# Patient Record
Sex: MALE | Race: WHITE | Employment: UNEMPLOYED | URBAN - METROPOLITAN AREA
[De-identification: names, ages, dates, MRNs, and addresses within clinical notes are randomized per-mention and may not be internally consistent; named-entity substitution may affect disease eponyms.]

---

## 2023-12-09 ENCOUNTER — HOSPITAL ENCOUNTER (INPATIENT)
Age: 53
LOS: 6 days | Discharge: HOME | End: 2023-12-15
Attending: EMERGENCY MEDICINE | Admitting: HOSPITALIST
Payer: MEDICAID

## 2023-12-09 ENCOUNTER — APPOINTMENT (OUTPATIENT)
Dept: GENERAL RADIOLOGY | Age: 53
End: 2023-12-09
Payer: MEDICAID

## 2023-12-09 DIAGNOSIS — R07.9 CHEST PAIN, UNSPECIFIED TYPE: ICD-10-CM

## 2023-12-09 DIAGNOSIS — F41.1 ANXIETY STATE: ICD-10-CM

## 2023-12-09 DIAGNOSIS — J44.1 COPD EXACERBATION (HCC): Primary | ICD-10-CM

## 2023-12-09 DIAGNOSIS — R06.02 SHORTNESS OF BREATH: ICD-10-CM

## 2023-12-09 PROBLEM — J96.00 ACUTE RESPIRATORY FAILURE (HCC): Status: ACTIVE | Noted: 2023-12-09

## 2023-12-09 LAB
ALBUMIN SERPL-MCNC: 4 G/DL (ref 3.4–5)
ALBUMIN/GLOB SERPL: 1.1 {RATIO} (ref 1.1–2.2)
ALP SERPL-CCNC: 102 U/L (ref 40–129)
ALT SERPL-CCNC: 57 U/L (ref 10–40)
AMPHETAMINES UR QL SCN>1000 NG/ML: POSITIVE
ANION GAP SERPL CALCULATED.3IONS-SCNC: 15 MMOL/L (ref 3–16)
AST SERPL-CCNC: 48 U/L (ref 15–37)
BARBITURATES UR QL SCN>200 NG/ML: ABNORMAL
BASE EXCESS BLDV CALC-SCNC: 0.2 MMOL/L
BASOPHILS # BLD: 0 K/UL (ref 0–0.2)
BASOPHILS NFR BLD: 0.2 %
BENZODIAZ UR QL SCN>200 NG/ML: ABNORMAL
BILIRUB SERPL-MCNC: 0.7 MG/DL (ref 0–1)
BILIRUB UR QL STRIP.AUTO: NEGATIVE
BUN SERPL-MCNC: 6 MG/DL (ref 7–20)
CALCIUM SERPL-MCNC: 9.6 MG/DL (ref 8.3–10.6)
CANNABINOIDS UR QL SCN>50 NG/ML: ABNORMAL
CHLORIDE SERPL-SCNC: 97 MMOL/L (ref 99–110)
CLARITY UR: CLEAR
CO2 BLDV-SCNC: 27 MMOL/L
CO2 SERPL-SCNC: 21 MMOL/L (ref 21–32)
COCAINE UR QL SCN: ABNORMAL
COHGB MFR BLDV: 0.7 %
COLOR UR: YELLOW
CREAT SERPL-MCNC: 0.6 MG/DL (ref 0.9–1.3)
D DIMER: 0.52 UG/ML FEU (ref 0–0.6)
DEPRECATED RDW RBC AUTO: 13.6 % (ref 12.4–15.4)
DRUG SCREEN COMMENT UR-IMP: ABNORMAL
EKG ATRIAL RATE: 121 BPM
EKG DIAGNOSIS: NORMAL
EKG P AXIS: 80 DEGREES
EKG P-R INTERVAL: 150 MS
EKG Q-T INTERVAL: 324 MS
EKG QRS DURATION: 80 MS
EKG QTC CALCULATION (BAZETT): 460 MS
EKG R AXIS: 70 DEGREES
EKG T AXIS: 63 DEGREES
EKG VENTRICULAR RATE: 121 BPM
EOSINOPHIL # BLD: 0 K/UL (ref 0–0.6)
EOSINOPHIL NFR BLD: 0 %
FENTANYL SCREEN, URINE: ABNORMAL
FLUAV RNA UPPER RESP QL NAA+PROBE: NEGATIVE
FLUBV AG NPH QL: NEGATIVE
GFR SERPLBLD CREATININE-BSD FMLA CKD-EPI: >60 ML/MIN/{1.73_M2}
GLUCOSE SERPL-MCNC: 172 MG/DL (ref 70–99)
GLUCOSE UR STRIP.AUTO-MCNC: NEGATIVE MG/DL
HCO3 BLDV-SCNC: 25 MMOL/L (ref 23–29)
HCT VFR BLD AUTO: 37.6 % (ref 40.5–52.5)
HGB BLD-MCNC: 12.6 G/DL (ref 13.5–17.5)
HGB UR QL STRIP.AUTO: NEGATIVE
KETONES UR STRIP.AUTO-MCNC: NEGATIVE MG/DL
LACTATE BLDV-SCNC: 1.1 MMOL/L (ref 0.4–1.9)
LACTATE BLDV-SCNC: 2.2 MMOL/L (ref 0.4–1.9)
LEUKOCYTE ESTERASE UR QL STRIP.AUTO: NEGATIVE
LYMPHOCYTES # BLD: 1.1 K/UL (ref 1–5.1)
LYMPHOCYTES NFR BLD: 8.3 %
MCH RBC QN AUTO: 30.3 PG (ref 26–34)
MCHC RBC AUTO-ENTMCNC: 33.4 G/DL (ref 31–36)
MCV RBC AUTO: 90.8 FL (ref 80–100)
METHADONE UR QL SCN>300 NG/ML: ABNORMAL
METHGB MFR BLDV: 0.5 %
MONOCYTES # BLD: 0.4 K/UL (ref 0–1.3)
MONOCYTES NFR BLD: 3.1 %
NEUTROPHILS # BLD: 11.9 K/UL (ref 1.7–7.7)
NEUTROPHILS NFR BLD: 88.4 %
NITRITE UR QL STRIP.AUTO: NEGATIVE
NT-PROBNP SERPL-MCNC: 69 PG/ML (ref 0–124)
O2 THERAPY: NORMAL
OPIATES UR QL SCN>300 NG/ML: ABNORMAL
OXYCODONE UR QL SCN: ABNORMAL
PCO2 BLDV: 41.9 MMHG (ref 40–50)
PCP UR QL SCN>25 NG/ML: ABNORMAL
PH BLDV: 7.39 [PH] (ref 7.35–7.45)
PH UR STRIP.AUTO: 6.5 [PH] (ref 5–8)
PH UR STRIP: 6.5 [PH]
PLATELET # BLD AUTO: 348 K/UL (ref 135–450)
PLATELET BLD QL SMEAR: ADEQUATE
PMV BLD AUTO: 8.1 FL (ref 5–10.5)
PO2 BLDV: 58 MMHG
POTASSIUM SERPL-SCNC: 4.6 MMOL/L (ref 3.5–5.1)
PROT SERPL-MCNC: 7.5 G/DL (ref 6.4–8.2)
PROT UR STRIP.AUTO-MCNC: NEGATIVE MG/DL
RBC # BLD AUTO: 4.14 M/UL (ref 4.2–5.9)
SAO2 % BLDV: 87 %
SARS-COV-2 RDRP RESP QL NAA+PROBE: NOT DETECTED
SLIDE REVIEW: ABNORMAL
SODIUM SERPL-SCNC: 133 MMOL/L (ref 136–145)
SP GR UR STRIP.AUTO: 1.01 (ref 1–1.03)
TROPONIN, HIGH SENSITIVITY: 10 NG/L (ref 0–22)
TROPONIN, HIGH SENSITIVITY: 11 NG/L (ref 0–22)
UA COMPLETE W REFLEX CULTURE PNL UR: NORMAL
UA DIPSTICK W REFLEX MICRO PNL UR: NORMAL
URN SPEC COLLECT METH UR: NORMAL
UROBILINOGEN UR STRIP-ACNC: 0.2 E.U./DL
WBC # BLD AUTO: 13.4 K/UL (ref 4–11)

## 2023-12-09 PROCEDURE — 2580000003 HC RX 258: Performed by: HOSPITALIST

## 2023-12-09 PROCEDURE — 36415 COLL VENOUS BLD VENIPUNCTURE: CPT

## 2023-12-09 PROCEDURE — 99255 IP/OBS CONSLTJ NEW/EST HI 80: CPT | Performed by: INTERNAL MEDICINE

## 2023-12-09 PROCEDURE — 96365 THER/PROPH/DIAG IV INF INIT: CPT

## 2023-12-09 PROCEDURE — 99285 EMERGENCY DEPT VISIT HI MDM: CPT

## 2023-12-09 PROCEDURE — 80307 DRUG TEST PRSMV CHEM ANLYZR: CPT

## 2023-12-09 PROCEDURE — 93010 ELECTROCARDIOGRAM REPORT: CPT | Performed by: INTERNAL MEDICINE

## 2023-12-09 PROCEDURE — 82803 BLOOD GASES ANY COMBINATION: CPT

## 2023-12-09 PROCEDURE — 6360000002 HC RX W HCPCS: Performed by: EMERGENCY MEDICINE

## 2023-12-09 PROCEDURE — 96375 TX/PRO/DX INJ NEW DRUG ADDON: CPT

## 2023-12-09 PROCEDURE — 81003 URINALYSIS AUTO W/O SCOPE: CPT

## 2023-12-09 PROCEDURE — 87635 SARS-COV-2 COVID-19 AMP PRB: CPT

## 2023-12-09 PROCEDURE — 80053 COMPREHEN METABOLIC PANEL: CPT

## 2023-12-09 PROCEDURE — 2700000000 HC OXYGEN THERAPY PER DAY

## 2023-12-09 PROCEDURE — 85025 COMPLETE CBC W/AUTO DIFF WBC: CPT

## 2023-12-09 PROCEDURE — 6370000000 HC RX 637 (ALT 250 FOR IP): Performed by: INTERNAL MEDICINE

## 2023-12-09 PROCEDURE — 83880 ASSAY OF NATRIURETIC PEPTIDE: CPT

## 2023-12-09 PROCEDURE — 83605 ASSAY OF LACTIC ACID: CPT

## 2023-12-09 PROCEDURE — 71045 X-RAY EXAM CHEST 1 VIEW: CPT

## 2023-12-09 PROCEDURE — 87804 INFLUENZA ASSAY W/OPTIC: CPT

## 2023-12-09 PROCEDURE — 94660 CPAP INITIATION&MGMT: CPT

## 2023-12-09 PROCEDURE — 6360000002 HC RX W HCPCS: Performed by: HOSPITALIST

## 2023-12-09 PROCEDURE — 6370000000 HC RX 637 (ALT 250 FOR IP): Performed by: EMERGENCY MEDICINE

## 2023-12-09 PROCEDURE — 85379 FIBRIN DEGRADATION QUANT: CPT

## 2023-12-09 PROCEDURE — 87040 BLOOD CULTURE FOR BACTERIA: CPT

## 2023-12-09 PROCEDURE — 93005 ELECTROCARDIOGRAM TRACING: CPT | Performed by: EMERGENCY MEDICINE

## 2023-12-09 PROCEDURE — 94640 AIRWAY INHALATION TREATMENT: CPT

## 2023-12-09 PROCEDURE — 94761 N-INVAS EAR/PLS OXIMETRY MLT: CPT

## 2023-12-09 PROCEDURE — 84484 ASSAY OF TROPONIN QUANT: CPT

## 2023-12-09 PROCEDURE — 2000000000 HC ICU R&B

## 2023-12-09 RX ORDER — ALBUTEROL SULFATE 2.5 MG/3ML
2.5 SOLUTION RESPIRATORY (INHALATION) ONCE
Status: DISCONTINUED | OUTPATIENT
Start: 2023-12-09 | End: 2023-12-15 | Stop reason: HOSPADM

## 2023-12-09 RX ORDER — ACETAMINOPHEN 650 MG/1
650 SUPPOSITORY RECTAL EVERY 6 HOURS PRN
Status: DISCONTINUED | OUTPATIENT
Start: 2023-12-09 | End: 2023-12-15 | Stop reason: HOSPADM

## 2023-12-09 RX ORDER — LEVOFLOXACIN 5 MG/ML
750 INJECTION, SOLUTION INTRAVENOUS EVERY 24 HOURS
Status: COMPLETED | OUTPATIENT
Start: 2023-12-10 | End: 2023-12-13

## 2023-12-09 RX ORDER — IPRATROPIUM BROMIDE AND ALBUTEROL SULFATE 2.5; .5 MG/3ML; MG/3ML
1 SOLUTION RESPIRATORY (INHALATION)
Status: DISCONTINUED | OUTPATIENT
Start: 2023-12-09 | End: 2023-12-15 | Stop reason: HOSPADM

## 2023-12-09 RX ORDER — POTASSIUM CHLORIDE 7.45 MG/ML
10 INJECTION INTRAVENOUS PRN
Status: DISCONTINUED | OUTPATIENT
Start: 2023-12-09 | End: 2023-12-15 | Stop reason: HOSPADM

## 2023-12-09 RX ORDER — ONDANSETRON 2 MG/ML
4 INJECTION INTRAMUSCULAR; INTRAVENOUS EVERY 6 HOURS PRN
Status: DISCONTINUED | OUTPATIENT
Start: 2023-12-09 | End: 2023-12-15 | Stop reason: HOSPADM

## 2023-12-09 RX ORDER — ONDANSETRON 4 MG/1
4 TABLET, ORALLY DISINTEGRATING ORAL EVERY 8 HOURS PRN
Status: DISCONTINUED | OUTPATIENT
Start: 2023-12-09 | End: 2023-12-15 | Stop reason: HOSPADM

## 2023-12-09 RX ORDER — IPRATROPIUM BROMIDE AND ALBUTEROL SULFATE 2.5; .5 MG/3ML; MG/3ML
1 SOLUTION RESPIRATORY (INHALATION) ONCE
Status: COMPLETED | OUTPATIENT
Start: 2023-12-09 | End: 2023-12-09

## 2023-12-09 RX ORDER — ALBUTEROL SULFATE 2.5 MG/3ML
2.5 SOLUTION RESPIRATORY (INHALATION) ONCE
Status: COMPLETED | OUTPATIENT
Start: 2023-12-09 | End: 2023-12-09

## 2023-12-09 RX ORDER — POLYETHYLENE GLYCOL 3350 17 G/17G
17 POWDER, FOR SOLUTION ORAL DAILY PRN
Status: DISCONTINUED | OUTPATIENT
Start: 2023-12-09 | End: 2023-12-15 | Stop reason: HOSPADM

## 2023-12-09 RX ORDER — LORAZEPAM 2 MG/ML
0.5 INJECTION INTRAMUSCULAR ONCE
Status: COMPLETED | OUTPATIENT
Start: 2023-12-09 | End: 2023-12-09

## 2023-12-09 RX ORDER — LORAZEPAM 2 MG/ML
1 INJECTION INTRAMUSCULAR EVERY 6 HOURS PRN
Status: DISCONTINUED | OUTPATIENT
Start: 2023-12-09 | End: 2023-12-14

## 2023-12-09 RX ORDER — POTASSIUM CHLORIDE 20 MEQ/1
40 TABLET, EXTENDED RELEASE ORAL PRN
Status: DISCONTINUED | OUTPATIENT
Start: 2023-12-09 | End: 2023-12-15 | Stop reason: HOSPADM

## 2023-12-09 RX ORDER — ENOXAPARIN SODIUM 100 MG/ML
40 INJECTION SUBCUTANEOUS DAILY
Status: DISCONTINUED | OUTPATIENT
Start: 2023-12-09 | End: 2023-12-15 | Stop reason: HOSPADM

## 2023-12-09 RX ORDER — ACETAMINOPHEN 325 MG/1
650 TABLET ORAL EVERY 6 HOURS PRN
Status: DISCONTINUED | OUTPATIENT
Start: 2023-12-09 | End: 2023-12-15 | Stop reason: HOSPADM

## 2023-12-09 RX ORDER — LEVOFLOXACIN 5 MG/ML
750 INJECTION, SOLUTION INTRAVENOUS ONCE
Status: COMPLETED | OUTPATIENT
Start: 2023-12-09 | End: 2023-12-09

## 2023-12-09 RX ORDER — MAGNESIUM SULFATE IN WATER 40 MG/ML
2000 INJECTION, SOLUTION INTRAVENOUS PRN
Status: DISCONTINUED | OUTPATIENT
Start: 2023-12-09 | End: 2023-12-15 | Stop reason: HOSPADM

## 2023-12-09 RX ORDER — SODIUM CHLORIDE 0.9 % (FLUSH) 0.9 %
5-40 SYRINGE (ML) INJECTION EVERY 12 HOURS SCHEDULED
Status: DISCONTINUED | OUTPATIENT
Start: 2023-12-09 | End: 2023-12-15 | Stop reason: HOSPADM

## 2023-12-09 RX ORDER — ALBUTEROL SULFATE 2.5 MG/3ML
2.5 SOLUTION RESPIRATORY (INHALATION)
Status: DISCONTINUED | OUTPATIENT
Start: 2023-12-09 | End: 2023-12-09

## 2023-12-09 RX ORDER — SODIUM CHLORIDE 9 MG/ML
INJECTION, SOLUTION INTRAVENOUS CONTINUOUS
Status: DISCONTINUED | OUTPATIENT
Start: 2023-12-09 | End: 2023-12-12

## 2023-12-09 RX ORDER — SODIUM CHLORIDE 9 MG/ML
INJECTION, SOLUTION INTRAVENOUS PRN
Status: DISCONTINUED | OUTPATIENT
Start: 2023-12-09 | End: 2023-12-15 | Stop reason: HOSPADM

## 2023-12-09 RX ORDER — METHYLPREDNISOLONE SODIUM SUCCINATE 40 MG/ML
40 INJECTION, POWDER, LYOPHILIZED, FOR SOLUTION INTRAMUSCULAR; INTRAVENOUS EVERY 8 HOURS
Status: DISCONTINUED | OUTPATIENT
Start: 2023-12-09 | End: 2023-12-13

## 2023-12-09 RX ORDER — SODIUM CHLORIDE 0.9 % (FLUSH) 0.9 %
5-40 SYRINGE (ML) INJECTION PRN
Status: DISCONTINUED | OUTPATIENT
Start: 2023-12-09 | End: 2023-12-15 | Stop reason: HOSPADM

## 2023-12-09 RX ADMIN — ALBUTEROL SULFATE 2.5 MG: 2.5 SOLUTION RESPIRATORY (INHALATION) at 12:13

## 2023-12-09 RX ADMIN — ALBUTEROL SULFATE 2.5 MG: 2.5 SOLUTION RESPIRATORY (INHALATION) at 04:16

## 2023-12-09 RX ADMIN — METHYLPREDNISOLONE SODIUM SUCCINATE 40 MG: 40 INJECTION INTRAMUSCULAR; INTRAVENOUS at 08:53

## 2023-12-09 RX ADMIN — SODIUM CHLORIDE, PRESERVATIVE FREE 10 ML: 5 INJECTION INTRAVENOUS at 08:53

## 2023-12-09 RX ADMIN — IPRATROPIUM BROMIDE AND ALBUTEROL SULFATE 1 DOSE: 2.5; .5 SOLUTION RESPIRATORY (INHALATION) at 16:01

## 2023-12-09 RX ADMIN — SODIUM CHLORIDE, PRESERVATIVE FREE 10 ML: 5 INJECTION INTRAVENOUS at 20:09

## 2023-12-09 RX ADMIN — IPRATROPIUM BROMIDE AND ALBUTEROL SULFATE 1 DOSE: .5; 3 SOLUTION RESPIRATORY (INHALATION) at 04:16

## 2023-12-09 RX ADMIN — METHYLPREDNISOLONE SODIUM SUCCINATE 40 MG: 40 INJECTION INTRAMUSCULAR; INTRAVENOUS at 17:06

## 2023-12-09 RX ADMIN — Medication 0.5 MG: at 04:24

## 2023-12-09 RX ADMIN — IPRATROPIUM BROMIDE 0.5 MG: 0.5 SOLUTION RESPIRATORY (INHALATION) at 12:13

## 2023-12-09 RX ADMIN — ENOXAPARIN SODIUM 40 MG: 100 INJECTION SUBCUTANEOUS at 08:53

## 2023-12-09 RX ADMIN — Medication 1 MG: at 11:54

## 2023-12-09 RX ADMIN — SODIUM CHLORIDE: 9 INJECTION, SOLUTION INTRAVENOUS at 08:56

## 2023-12-09 RX ADMIN — Medication 1 MG: at 17:52

## 2023-12-09 RX ADMIN — IPRATROPIUM BROMIDE AND ALBUTEROL SULFATE 1 DOSE: 2.5; .5 SOLUTION RESPIRATORY (INHALATION) at 20:14

## 2023-12-09 RX ADMIN — SODIUM CHLORIDE: 9 INJECTION, SOLUTION INTRAVENOUS at 22:08

## 2023-12-09 RX ADMIN — LEVOFLOXACIN 750 MG: 5 INJECTION, SOLUTION INTRAVENOUS at 07:03

## 2023-12-09 ASSESSMENT — PAIN DESCRIPTION - PAIN TYPE: TYPE: ACUTE PAIN

## 2023-12-09 ASSESSMENT — PAIN SCALES - GENERAL
PAINLEVEL_OUTOF10: 0
PAINLEVEL_OUTOF10: 7
PAINLEVEL_OUTOF10: 0

## 2023-12-09 ASSESSMENT — PAIN DESCRIPTION - DESCRIPTORS: DESCRIPTORS: ACHING

## 2023-12-09 ASSESSMENT — PAIN DESCRIPTION - ORIENTATION: ORIENTATION: LEFT

## 2023-12-09 ASSESSMENT — PAIN DESCRIPTION - ONSET: ONSET: ON-GOING

## 2023-12-09 ASSESSMENT — PAIN DESCRIPTION - FREQUENCY: FREQUENCY: CONTINUOUS

## 2023-12-09 ASSESSMENT — PAIN DESCRIPTION - LOCATION: LOCATION: RIB CAGE

## 2023-12-09 NOTE — PROGRESS NOTES
Patient states he gets his medications through [facility] services. RN called pharmacist to inform and to try and get medication list information. RN also called patient's contact number Parrish Altman and requested to bring in a copy of medications. She stated she will try to bring them in tomorrow.      Yanna Azar RN

## 2023-12-09 NOTE — PROGRESS NOTES
12/09/23 0343   NIV Type   $NIV $Daily Charge   Ventilator ID [id number]   NIV Started/Stopped On   Equipment Type V60`   Mode Bilevel   Mask Type Full face mask   Mask Size Medium   Assessment   Pulse (!) 133   Heart Rate Source Monitor   Respirations (!) 32   SpO2 90 %   Level of Consciousness 0   Comfort Level Good   Using Accessory Muscles Yes   Mask Compliance Good   Skin Assessment Clean, dry, & intact   Skin Protection for O2 Device Yes   Orientation Middle   Location Nose   Intervention(s) Skin Barrier   Breath Sounds   Right Upper Lobe Expiratory wheezes   Right Middle Lobe Expiratory wheezes   Right Lower Lobe Expiratory wheezes   Left Upper Lobe Expiratory wheezes   Left Lower Lobe Expiratory wheezes   Settings/Measurements   PIP Observed 16 cm H20   IPAP 16 cmH20   CPAP/EPAP 6 cmH2O   Vt (Measured) 576 mL   Rate Ordered 16   Insp Rise Time (%) 3 %   FiO2  40 %   I Time/ I Time % 1 s   Minute Volume (L/min) 8.8 Liters   Mask Leak (lpm) 23 lpm   Alarm Settings   Alarms On Y   High Pressure (cmH2O) 40 cmH2O   Delay Alarm 20 sec(s)   Apnea (secs) 20 secs   RR High (bpm) 40 br/min

## 2023-12-09 NOTE — PROGRESS NOTES
Per patient he admits to eating meth, last time he did so is on Sunday per patient.      Corrinne High, RN

## 2023-12-09 NOTE — ED NOTES
Report called to ICU. All questions answered. Will contact RT to transport.       Santillanrachel Parr, [address]  12/09/23 8636

## 2023-12-09 NOTE — FLOWSHEET NOTE
Reassessment complete. Patient with intermittent episodes of anxiousness and very fidgety. Patient all over the bed and cannot sit still. Patient educated to try and relax, prn ativan given. Remains on BPAP. Will monitor.      Jaguar Sanchez RN

## 2023-12-09 NOTE — ED NOTES
Reyes Gresham, patients emergency contact called and given update on pt. Advised of room number.       Giovany Arciniega, [address]  12/09/23 4378

## 2023-12-09 NOTE — PLAN OF CARE
VSS. NSR/ST on monitor. Patient is alert and oriented, restless and anxious at times. Remains on BPAP. Turns self well for pressure ulcer prevention. Medication for DVT prophylaxis. Free from any injury. Patient updated on plan of care. Will continue plan of care.       Problem: Discharge Planning  Goal: Discharge to home or other facility with appropriate resources  Outcome: Progressing  Flowsheets (Taken 12/9/2023 8352)  Discharge to home or other facility with appropriate resources:   Identify barriers to discharge with patient and caregiver   Arrange for needed discharge resources and transportation as appropriate   Identify discharge learning needs (meds, wound care, etc)     Problem: Pain  Goal: Verbalizes/displays adequate comfort level or baseline comfort level  Outcome: Progressing     Problem: Safety - Adult  Goal: Free from fall injury  Outcome: Progressing     Problem: Respiratory - Adult  Goal: Achieves optimal ventilation and oxygenation  Outcome: Progressing     Problem: Musculoskeletal - Adult  Goal: Return mobility to safest level of function  Outcome: Progressing     Problem: Infection - Adult  Goal: Absence of infection at discharge  Outcome: Progressing     Problem: Chronic Conditions and Co-morbidities  Goal: Patient's chronic conditions and co-morbidity symptoms are monitored and maintained or improved  Outcome: Progressing

## 2023-12-09 NOTE — H&P
V2.0  History and Physical      Name:  Glenny Figueroa /Age/Sex: 1970  (47 y.o. male)   MRN & CSN:  [id number] & [id number] Encounter Date/Time: 2023 7:24 AM EST   Location:  [address] [place] [address] PCP: Minal Blanco [address] [place] [address]  Day: 1    Assessment and Plan:   Glenny Figueroa is a 47 y.o. male with a pmh of  who presents with Acute respiratory failure [facility]    Hospital Problems             Last Modified POA    * (Principal) Acute respiratory failure ([address] ) 2023 Yes         Acute respiratory failure with hypoxia  H/o polysubstance abuse   Bipolar DO  PTSD      Plan    Admit to ICU  Admit inpatient status  Telemetry monitering    Pulmonary consult   Empiric IV antibiotics  Follow up Blood culture x 2  Cont BIPAP  Oxygen  Duonebs         Critical care time spent > 35 minutes at bed side          Disposition:   Current Living situation:   Expected Disposition:   Estimated D/C:     Diet No diet orders on file   DVT Prophylaxis [] Lovenox, []  Heparin, [] SCDs, [] Ambulation,  [] Eliquis, [] Xarelto, [] Coumadin   Code Status No Order   Surrogate Decision Maker/ POA      Personally reviewed Lab Studies and Imaging     Discussed management of the case with  ED who recommended   Hospital admission for Acute respiratory failure management    EKG interpreted personally and results     Imaging that was interpreted personally includes CXR   and results  acute bronchitis     Drugs that require monitoring for toxicity include IV levaquin  and the method of monitoring was telemetry         History from:       ED        History of Present Illness:     Chief Complaint:   Glenny Figueroa is a 47 y.o. male with pmh of  polysubstance use  , h/o alcohol abuse , mood disorder who presents with  c/o SOB x 2-3 days  His pulmonologist increased his prednisone from 10 mg daily to 40 mg daily   He was brought by EMS , he was tripoding on arrival.  Having significant work of breathing and was placed on BIPAP    ED    D

## 2023-12-09 NOTE — CONSULTS
REASON FOR CONSULTATION/CC: Wheezing      Consult at request of Eric Roy MD     PCP: KATY Kasper - CNP  Established Pulmonologist:  None    Chief Complaint   Patient presents with    Respiratory Distress     Increasing difficulty breathing x 3 days. Presents via EMS. Duoneb provided by EMS. Unable to obtain IV access per EMS. HISTORY OF PRESENT ILLNESS: Moni Thompson is a 47y.o. year old male with a history of polysubstance abuse, mood disorder who presents with shortness of breath x 3 days. Patient brought to the ED placed on BiPAP. During my visit patient on BiPAP but over the HPI is obtained from bedside nurse report EMR. He is awake however. In the ED he admitted to taking methamphetamines. He has a history of reported bipolar and schizophrenia. Reported wheezing so was given bronchodilator therapy but no known history of reactive airways disease. Past Medical History:   Diagnosis Date    Bipolar 1 disorder ([address])     COPD (chronic obstructive pulmonary disease) ([facility])     PTSD (post-traumatic stress disorder)          No past surgical history on file. Family Hx  family history is not on file. Unable to obtain due to BiPAP  Social Hx   has no history on file for tobacco use.     Scheduled Meds:   albuterol  2.5 mg Nebulization Once    [START ON 12/10/2023] levofloxacin  750 mg IntraVENous Q24H    methylPREDNISolone  40 mg IntraVENous Q8H    sodium chloride flush  5-40 mL IntraVENous 2 times per day    enoxaparin  40 mg SubCUTAneous Daily    ipratropium 0.5 mg-albuterol 2.5 mg  1 Dose Inhalation Q4H WA RT       Continuous Infusions:   sodium chloride 75 mL/hr at 12/09/23 0856    sodium chloride         PRN Meds:  LORazepam, sodium chloride flush, sodium chloride, potassium chloride **OR** potassium alternative oral replacement **OR** potassium chloride, magnesium sulfate, ondansetron **OR** ondansetron, polyethylene glycol, acetaminophen **OR**

## 2023-12-09 NOTE — PROGRESS NOTES
4 Eyes Skin Assessment     The patient is being assess for  Admission    I agree that 2 RN's have performed a thorough Head to Toe Skin Assessment on the patient. ALL assessment sites listed below have been assessed. Areas assessed by both nurses: Amy Gibson RN  [x]   Head, Face, and Ears   [x]   Shoulders, Back, and Chest  [x]   Arms, Elbows, and Hands   [x]   Coccyx, Sacrum, and Ischum  [x]   Legs, Feet, and Heels        Does the Patient have Skin Breakdown?   No - patient has multiple abrasions and scabbing all over body         Alon Prevention initiated:  NA   Wound Care Orders initiated:  No      WOC nurse consulted for Pressure Injury (Stage 3,4, Unstageable, DTI, NWPT, and Complex wounds):  NA      Nurse 1 eSignature: Electronically signed by Luz Marina Xie RN on 12/9/23 at 10:46 AM EST    **SHARE this note so that the co-signing nurse is able to place an eSignature**    Nurse 2 eSignature: Electronically signed by Jay Osborne RN on 12/9/23 at 5:56 PM EST

## 2023-12-09 NOTE — ED NOTES
Handoff report received from [place], [place]. Pt is awake in bed with BiPap on. Pt placed in gown and all other clothing placed in bag.        Jacob Vieyra RN  12/09/23 0858

## 2023-12-09 NOTE — PROGRESS NOTES
When completing patient's admission, patient states he is on \"a lot of medications\" patient cannot name any medications but states he is on \"30 some medications\" and to \"look them up in chart\". Patient with difficulty talking due to breathing, requested medication list or to be brought in by family to review.      Katie Erickson RN

## 2023-12-09 NOTE — ED PROVIDER NOTES
[address] [place] [address] EMERGENCY DEPARTMENT    CHIEF COMPLAINT  Respiratory Distress (Increasing difficulty breathing x 3 days. Presents via EMS. Duoneb provided by EMS. Unable to obtain IV access per EMS. )       HISTORY OF PRESENT ILLNESS  Molly Flannery is a 47 y.o. male with history of COPD, hyperlipidemia, hypertension, schizoaffective disorder, ? alcohol dependence who presents to the ED w/ shortness of breath. States that he has been having increasing shortness of breath over the past 3 days. Reports cough. His pulmonologist increased his prednisone dose from his daily 10 mg to 40 mg which she took today. Symptoms worsened this evening and he presents with chest pain, shortness of breath and increased work of breathing. He was saturating 96% upon EMS arrival.  Wheezing noted by EMS. He was given 2 nebulizer treatments en route. Patient reports left-sided chest pain. Reports chronic feet swelling that is unchanged. Denies fever. I have reviewed the following from the nursing documentation:    No past medical history on file. No past surgical history on file. No family history on file.   Social History     Socioeconomic History    Marital status: Single     Spouse name: Not on file    Number of children: Not on file    Years of education: Not on file    Highest education level: Not on file   Occupational History    Not on file   Tobacco Use    Smoking status: Not on file    Smokeless tobacco: Not on file   Substance and Sexual Activity    Alcohol use: Not on file    Drug use: Not on file    Sexual activity: Not on file   Other Topics Concern    Not on file   Social History Narrative    Not on file     Social Determinants of Health     Financial Resource Strain: Not on file   Food Insecurity: Not on file   Transportation Needs: Not on file   Physical Activity: Not on file   Stress: Not on file   Social Connections: Not on file   Intimate Partner Violence: Not on file   Housing Stability: Not on

## 2023-12-10 PROBLEM — F15.20 METHAMPHETAMINE USE DISORDER, MODERATE (HCC): Status: ACTIVE | Noted: 2023-12-10

## 2023-12-10 PROBLEM — R06.02 SHORTNESS OF BREATH: Status: ACTIVE | Noted: 2023-12-10

## 2023-12-10 PROBLEM — F41.9 ANXIETY DISORDER: Status: ACTIVE | Noted: 2023-12-10

## 2023-12-10 PROBLEM — R45.851 SUICIDAL IDEATION: Status: ACTIVE | Noted: 2023-12-10

## 2023-12-10 PROBLEM — F32.A DEPRESSION: Status: ACTIVE | Noted: 2023-12-10

## 2023-12-10 PROBLEM — J44.1 COPD EXACERBATION (HCC): Status: ACTIVE | Noted: 2023-12-10

## 2023-12-10 LAB
ANION GAP SERPL CALCULATED.3IONS-SCNC: 11 MMOL/L (ref 3–16)
BASOPHILS # BLD: 0 K/UL (ref 0–0.2)
BASOPHILS NFR BLD: 0.1 %
BUN SERPL-MCNC: 11 MG/DL (ref 7–20)
CALCIUM SERPL-MCNC: 9 MG/DL (ref 8.3–10.6)
CHLORIDE SERPL-SCNC: 101 MMOL/L (ref 99–110)
CO2 SERPL-SCNC: 25 MMOL/L (ref 21–32)
CREAT SERPL-MCNC: 0.8 MG/DL (ref 0.9–1.3)
DEPRECATED RDW RBC AUTO: 13.5 % (ref 12.4–15.4)
EOSINOPHIL # BLD: 0 K/UL (ref 0–0.6)
EOSINOPHIL NFR BLD: 0 %
GFR SERPLBLD CREATININE-BSD FMLA CKD-EPI: >60 ML/MIN/{1.73_M2}
GLUCOSE SERPL-MCNC: 197 MG/DL (ref 70–99)
HCT VFR BLD AUTO: 32.2 % (ref 40.5–52.5)
HGB BLD-MCNC: 11.2 G/DL (ref 13.5–17.5)
LYMPHOCYTES # BLD: 0.5 K/UL (ref 1–5.1)
LYMPHOCYTES NFR BLD: 5.4 %
MAGNESIUM SERPL-MCNC: 2 MG/DL (ref 1.8–2.4)
MCH RBC QN AUTO: 31.6 PG (ref 26–34)
MCHC RBC AUTO-ENTMCNC: 34.7 G/DL (ref 31–36)
MCV RBC AUTO: 91 FL (ref 80–100)
MONOCYTES # BLD: 0.4 K/UL (ref 0–1.3)
MONOCYTES NFR BLD: 4.6 %
NEUTROPHILS # BLD: 7.6 K/UL (ref 1.7–7.7)
NEUTROPHILS NFR BLD: 89.9 %
PHOSPHATE SERPL-MCNC: 2.8 MG/DL (ref 2.5–4.9)
PLATELET # BLD AUTO: 313 K/UL (ref 135–450)
PMV BLD AUTO: 7 FL (ref 5–10.5)
POTASSIUM SERPL-SCNC: 4 MMOL/L (ref 3.5–5.1)
RBC # BLD AUTO: 3.54 M/UL (ref 4.2–5.9)
SODIUM SERPL-SCNC: 137 MMOL/L (ref 136–145)
WBC # BLD AUTO: 8.4 K/UL (ref 4–11)

## 2023-12-10 PROCEDURE — 83735 ASSAY OF MAGNESIUM: CPT

## 2023-12-10 PROCEDURE — 6370000000 HC RX 637 (ALT 250 FOR IP): Performed by: NURSE PRACTITIONER

## 2023-12-10 PROCEDURE — 85025 COMPLETE CBC W/AUTO DIFF WBC: CPT

## 2023-12-10 PROCEDURE — 2000000000 HC ICU R&B

## 2023-12-10 PROCEDURE — 80048 BASIC METABOLIC PNL TOTAL CA: CPT

## 2023-12-10 PROCEDURE — 99254 IP/OBS CNSLTJ NEW/EST MOD 60: CPT | Performed by: PSYCHIATRY & NEUROLOGY

## 2023-12-10 PROCEDURE — 6370000000 HC RX 637 (ALT 250 FOR IP): Performed by: INTERNAL MEDICINE

## 2023-12-10 PROCEDURE — 99232 SBSQ HOSP IP/OBS MODERATE 35: CPT | Performed by: INTERNAL MEDICINE

## 2023-12-10 PROCEDURE — 94761 N-INVAS EAR/PLS OXIMETRY MLT: CPT

## 2023-12-10 PROCEDURE — 2580000003 HC RX 258: Performed by: HOSPITALIST

## 2023-12-10 PROCEDURE — 84100 ASSAY OF PHOSPHORUS: CPT

## 2023-12-10 PROCEDURE — 6360000002 HC RX W HCPCS: Performed by: NURSE PRACTITIONER

## 2023-12-10 PROCEDURE — 6360000002 HC RX W HCPCS: Performed by: HOSPITALIST

## 2023-12-10 PROCEDURE — 36415 COLL VENOUS BLD VENIPUNCTURE: CPT

## 2023-12-10 PROCEDURE — 94640 AIRWAY INHALATION TREATMENT: CPT

## 2023-12-10 PROCEDURE — 2700000000 HC OXYGEN THERAPY PER DAY

## 2023-12-10 PROCEDURE — 6370000000 HC RX 637 (ALT 250 FOR IP): Performed by: HOSPITALIST

## 2023-12-10 PROCEDURE — APPNB15 APP NON BILLABLE TIME 0-15 MINS: Performed by: NURSE PRACTITIONER

## 2023-12-10 PROCEDURE — 94660 CPAP INITIATION&MGMT: CPT

## 2023-12-10 RX ORDER — LABETALOL HYDROCHLORIDE 5 MG/ML
10 INJECTION, SOLUTION INTRAVENOUS EVERY 6 HOURS PRN
Status: DISCONTINUED | OUTPATIENT
Start: 2023-12-10 | End: 2023-12-15 | Stop reason: HOSPADM

## 2023-12-10 RX ORDER — WATER 10 ML/10ML
INJECTION INTRAMUSCULAR; INTRAVENOUS; SUBCUTANEOUS
Status: DISPENSED
Start: 2023-12-10 | End: 2023-12-10

## 2023-12-10 RX ORDER — GUAIFENESIN/DEXTROMETHORPHAN 100-10MG/5
10 SYRUP ORAL EVERY 4 HOURS PRN
Status: DISCONTINUED | OUTPATIENT
Start: 2023-12-10 | End: 2023-12-15 | Stop reason: HOSPADM

## 2023-12-10 RX ADMIN — Medication 1 MG: at 22:20

## 2023-12-10 RX ADMIN — GUAIFENESIN SYRUP AND DEXTROMETHORPHAN 10 ML: 100; 10 SYRUP ORAL at 07:54

## 2023-12-10 RX ADMIN — ENOXAPARIN SODIUM 40 MG: 100 INJECTION SUBCUTANEOUS at 08:05

## 2023-12-10 RX ADMIN — METHYLPREDNISOLONE SODIUM SUCCINATE 40 MG: 40 INJECTION INTRAMUSCULAR; INTRAVENOUS at 07:56

## 2023-12-10 RX ADMIN — SODIUM CHLORIDE, PRESERVATIVE FREE 10 ML: 5 INJECTION INTRAVENOUS at 07:57

## 2023-12-10 RX ADMIN — GUAIFENESIN SYRUP AND DEXTROMETHORPHAN 10 ML: 100; 10 SYRUP ORAL at 23:26

## 2023-12-10 RX ADMIN — LABETALOL HYDROCHLORIDE 10 MG: 5 INJECTION INTRAVENOUS at 23:21

## 2023-12-10 RX ADMIN — METHYLPREDNISOLONE SODIUM SUCCINATE 40 MG: 40 INJECTION INTRAMUSCULAR; INTRAVENOUS at 19:18

## 2023-12-10 RX ADMIN — IPRATROPIUM BROMIDE AND ALBUTEROL SULFATE 1 DOSE: 2.5; .5 SOLUTION RESPIRATORY (INHALATION) at 19:39

## 2023-12-10 RX ADMIN — SODIUM CHLORIDE, PRESERVATIVE FREE 10 ML: 5 INJECTION INTRAVENOUS at 20:24

## 2023-12-10 RX ADMIN — GUAIFENESIN SYRUP AND DEXTROMETHORPHAN 10 ML: 100; 10 SYRUP ORAL at 14:05

## 2023-12-10 RX ADMIN — Medication 1 MG: at 14:18

## 2023-12-10 RX ADMIN — Medication 1 MG: at 00:00

## 2023-12-10 RX ADMIN — METHYLPREDNISOLONE SODIUM SUCCINATE 40 MG: 40 INJECTION INTRAMUSCULAR; INTRAVENOUS at 00:30

## 2023-12-10 RX ADMIN — GUAIFENESIN SYRUP AND DEXTROMETHORPHAN 10 ML: 100; 10 SYRUP ORAL at 03:06

## 2023-12-10 RX ADMIN — IPRATROPIUM BROMIDE AND ALBUTEROL SULFATE 1 DOSE: 2.5; .5 SOLUTION RESPIRATORY (INHALATION) at 07:58

## 2023-12-10 RX ADMIN — LEVOFLOXACIN 750 MG: 5 INJECTION, SOLUTION INTRAVENOUS at 07:38

## 2023-12-10 RX ADMIN — ACETAMINOPHEN 650 MG: 325 TABLET ORAL at 22:19

## 2023-12-10 RX ADMIN — Medication 1 MG: at 07:53

## 2023-12-10 ASSESSMENT — PAIN SCALES - GENERAL
PAINLEVEL_OUTOF10: 0
PAINLEVEL_OUTOF10: 3
PAINLEVEL_OUTOF10: 0

## 2023-12-10 ASSESSMENT — PAIN DESCRIPTION - LOCATION: LOCATION: CHEST

## 2023-12-10 NOTE — PROGRESS NOTES
PRN Ativan dose tubed up from pharmacy in a 2 mg dose syringe. 1 mg given as per order, 1 mg wasted with second RN.      Electronically signed by Scott Huang RN on 12/10/2023 at 5:07 AM

## 2023-12-10 NOTE — PROGRESS NOTES
0000: Patient restless and anxious in bed. Tachypnic and tachycardic on monitor. Patient on bipap and gets coughing spells which increase his SOB and anxiety. 1 mg PRN Ativan given as per order. Secure message sent to NP to request something to help with cough. Patient updated. Call light in reach, bed alarm on.     0040: pt sleeping in bed, bipap on. Call light and urinal in reach. Will monitor. New order for prn cough medicine if needed.

## 2023-12-10 NOTE — PROGRESS NOTES
Pt open up about how frustrated he is with his life. He opened up about how he is upset about how his life turned out and how frustrated he is with his health. He says his past actions haunt him and all he does is think about them.      Electronically signed by Wilmer Lund RN on 12/10/2023 at 7:06 PM

## 2023-12-10 NOTE — PLAN OF CARE
Problem: Discharge Planning  Goal: Discharge to home or other facility with appropriate resources  12/10/2023 0845 by Tori Mcburney, RN  Outcome: Progressing  Flowsheets (Taken 12/10/2023 0800)  Discharge to home or other facility with appropriate resources:   Identify barriers to discharge with patient and caregiver   Arrange for needed discharge resources and transportation as appropriate  12/9/2023 2226 by Kassidy Alvarenga RN  Outcome: Progressing  Flowsheets  Taken 12/9/2023 2005 by Kassidy Alvarenga RN  Discharge to home or other facility with appropriate resources:   Identify barriers to discharge with patient and caregiver   Identify discharge learning needs (meds, wound care, etc)   Arrange for needed discharge resources and transportation as appropriate   Refer to discharge planning if patient needs post-hospital services based on physician order or complex needs related to functional status, cognitive ability or social support system  Taken 12/9/2023 1600 by Negar Jean RN  Discharge to home or other facility with appropriate resources:   Identify barriers to discharge with patient and caregiver   Arrange for needed discharge resources and transportation as appropriate   Identify discharge learning needs (meds, wound care, etc)     Problem: Pain  Goal: Verbalizes/displays adequate comfort level or baseline comfort level  12/10/2023 0845 by Tori Mcburney, RN  Outcome: Progressing  Flowsheets (Taken 12/10/2023 0805)  Verbalizes/displays adequate comfort level or baseline comfort level: Encourage patient to monitor pain and request assistance  12/9/2023 2226 by Kassidy Alvarenga RN  Outcome: Progressing     Problem: Safety - Adult  Goal: Free from fall injury  12/10/2023 0845 by Tori Mcburney, RN  Outcome: Progressing  Flowsheets (Taken 12/10/2023 0845)  Free From Fall Injury: Instruct family/caregiver on patient safety  12/9/2023 2226 by Kassidy Alvarenga RN  Outcome:

## 2023-12-10 NOTE — PROGRESS NOTES
Pt told Psych MD he is suicidal and tried to overdose on meth prior to coming to the hospital. Suicide sitter placed at bedside.     Electronically signed by Trish Caldwell RN on 12/10/2023 at 8:38 AM

## 2023-12-10 NOTE — PLAN OF CARE
Problem: Discharge Planning  Goal: Discharge to home or other facility with appropriate resources  12/9/2023 2226 by Ary Mcdowell RN  Outcome: Progressing  Flowsheets  Taken 12/9/2023 2005 by Ary Mcdowell RN  Discharge to home or other facility with appropriate resources:   Identify barriers to discharge with patient and caregiver   Identify discharge learning needs (meds, wound care, etc)   Arrange for needed discharge resources and transportation as appropriate   Refer to discharge planning if patient needs post-hospital services based on physician order or complex needs related to functional status, cognitive ability or social support system  Taken 12/9/2023 1600 by Luz Marina Xie RN  Discharge to home or other facility with appropriate resources:   Identify barriers to discharge with patient and caregiver   Arrange for needed discharge resources and transportation as appropriate   Identify discharge learning needs (meds, wound care, etc)     Problem: Pain  Goal: Verbalizes/displays adequate comfort level or baseline comfort level  12/9/2023 2226 by Ary Mcdowell RN  Outcome: Progressing     Problem: Safety - Adult  Goal: Free from fall injury  12/9/2023 2226 by Ary Mcdowell RN  Outcome: Progressing     Problem: Respiratory - Adult  Goal: Achieves optimal ventilation and oxygenation  12/9/2023 2226 by Ary Mcdowell RN  Outcome: Progressing     Problem: Musculoskeletal - Adult  Goal: Return mobility to safest level of function  12/9/2023 2226 by Ary Mcdowell RN  Outcome: Progressing  Flowsheets  Taken 12/9/2023 2005 by Ary Mcdowell RN  Return Mobility to Safest Level of Function:   Assess patient stability and activity tolerance for standing, transferring and ambulating with or without assistive devices   Assist with transfers and ambulation using safe patient handling equipment as needed   Instruct patient/family in ordered activity level  Taken 12/9/2023 1600 by Torito Diehl RN  Return Mobility to Safest Level of Function:   Assess patient stability and activity tolerance for standing, transferring and ambulating with or without assistive devices   Assist with transfers and ambulation using safe patient handling equipment as needed     Problem: Infection - Adult  Goal: Absence of infection at discharge  12/9/2023 2226 by Maya Massey RN  Outcome: Progressing  Flowsheets  Taken 12/9/2023 2005 by Maya Massey RN  Absence of infection at discharge:   Assess and monitor for signs and symptoms of infection   Monitor lab/diagnostic results   Monitor all insertion sites i.e., indwelling lines, tubes and drains   Administer medications as ordered   Identify and instruct in appropriate isolation precautions for identified infection/condition  Taken 12/9/2023 1600 by Torito Diehl RN  Absence of infection at discharge:   Assess and monitor for signs and symptoms of infection   Monitor lab/diagnostic results     Problem: Chronic Conditions and Co-morbidities  Goal: Patient's chronic conditions and co-morbidity symptoms are monitored and maintained or improved  12/9/2023 2226 by Maya Massey RN  Outcome: Progressing  Flowsheets  Taken 12/9/2023 2005 by Pancho Ace - Patient's Chronic Conditions and Co-Morbidity Symptoms are Monitored and Maintained or Improved:   Monitor and assess patient's chronic conditions and comorbid symptoms for stability, deterioration, or improvement   Collaborate with multidisciplinary team to address chronic and comorbid conditions and prevent exacerbation or deterioration   Update acute care plan with appropriate goals if chronic or comorbid symptoms are exacerbated and prevent overall improvement and discharge  Taken 12/9/2023 1600 by Pancho Claros - Patient's Chronic Conditions and Co-Morbidity Symptoms are Monitored and Maintained or Improved: Monitor and assess patient's chronic

## 2023-12-10 NOTE — PROGRESS NOTES
Medication Reconciliation    List of medications patient is currently taking is IN PROGRESS     We are anticipating patient's caretaker to bring in a list of home medications today    FLAVIA Gomez to notify me when caretaker arrives.     Janine Arauz RPH, PharmD, BCPS  12/10/2023 10:26 AM

## 2023-12-10 NOTE — CONSULTS
PSYCHIATRY CONSULT, INITIAL EVALUATION    Attending Provider:  Alirio Vargas MD    CC/Reason for Consult: bipolar disorder, schizophrenia    HPI:   context: Pt is a 48 yo M with hx of COPD, HANDY, bipolar disorder, schizoaffective disorder, PTSD, substance abuse who presented to the hospital with shortness of breath. He also reported recent methamphetamine ingestion. associated symptoms:   Pt reports that he has been increasingly anxious and depressed about his overall health, mainly his breathing. He has been getting more hopeless, not able to sleep for stretches of time up to 3 days, and more depressed as a result. About 3 days prior to admission he was starting to think about suicide and states prior to admission he ingested a large amount of methamphetamines to kill himself. He reports doing the same think in May and June but not being success with suicide. At this time he continues to report feeling suicidal, wanting to harm himself by overdose \"its the only way I can think of doing it\", feeling hopeless about his health condition and that nothing will get better with his breathing. He denies hallucinations or paranoia.     modifying factors: pt reports adherence with his medications however he doesn't know what any of them are. He states he doesn't use methamphetamines regularly, the last couple times was just to try to kill himself (twice in the summer, once prior to admission here)    Timing: acute on chronic  duration: many years  severity: severe    Chart review: In May he presented to [facility] with SOB, was transferred to the ED for medical clearance. He has voiced persecutory delusions (thinking his phone and internet were hacked, the police were after him) after recent methamphetamine use. In PES he was diagnosed with methamphetamine induced psychosis and psychosis cleared after about a day of observation.      ROS:   +SOB, denies cp or palpitations, no n/v, no abd pain, no headaches    Past Weight - Scale: 95.7 kg (211 lb), BP: (!) 152/107, Pain 0-10: Pain Level: 0, Location: scattered all over body;     MSE:   Appearance    alert, cooperative, good eye contact  Motor: No abnormal movements, tics or mannerisms. Speech    spontaneous and normal rate, easily short of breath between sentences  Language    0 - no aphasia, normal  Mood/Affect    Anxious and Depressed / depressed affect  Thought Process    linear and goal directed  Thought Content    no delusions , no suicidal ideation  Associations    logical connections  Attention/Concentration    intact  Orientation    oriented to person, place, time, and general circumstances  Memory    recent and remote memory intact  Fund of Knowledge    intact  Insight/Judgement    Poor / Impaired    Labs:     Lab Results   Component Value Date    CREATININE 0.8 (L) 12/10/2023    BUN 11 12/10/2023     12/10/2023    K 4.0 12/10/2023     12/10/2023    CO2 25 12/10/2023     Lab Results   Component Value Date    WBC 8.4 12/10/2023    HGB 11.2 (L) 12/10/2023    HCT 32.2 (L) 12/10/2023    MCV 91.0 12/10/2023     12/10/2023    LYMPHOPCT 5.4 12/10/2023    RBC 3.54 (L) 12/10/2023    MCH 31.6 12/10/2023    MCHC 34.7 12/10/2023    RDW 13.5 12/10/2023     Lab Results   Component Value Date    ALT 57 (H) 12/09/2023    AST 48 (H) 12/09/2023    ALKPHOS 102 12/09/2023    BILITOT 0.7 12/09/2023     No results found for: \"TSH\", \"TSHREFLEX\", \"TSHFT4\", \"TSHELE\", \"DZU5MGB\", \"TSHHS\"  No results found for: \"RVXXRDLV83\"  No results found for: \"FOLATE\"    UDS 12/9/23: +amphetamine       ASSESSMENT:   48 yo M with hx of methamphetamine abuse, substance induced depression and psychosis, and bipolar d/o vs schizoaffective disorder. He is reporting suicidal ideation and that he attempted to kill himself with meth prior to admission and has done this several times this year.  His main stressor is his breathing so certainly if he starts to symptomatically improve with this, and

## 2023-12-10 NOTE — PROGRESS NOTES
V2.0    [facility] [place]      Name:  Rosaura Lawrence /Age/Sex: 1970  (47 y.o. male)   MRN & CSN:  [id number] & [id number] Encounter Date/Time: 12/10/2023 12:07 PM EST   Location:  [id number]-[id number] PCP: KATY Vera CNP     Attending:Quintin Worley MD       Hospital Day: 2    Assessment and Recommendations   Rosaura Lawrence is a 47 y.o. male with pmh of  who presents with Acute respiratory failure ([address])      Acute respiratory failure with hypoxia  H/o polysubstance abuse   Bipolar DO  PTSD        Plan     Admit inpatient status  Telemetry monitering  Pulmonary consult   Empiric IV antibiotics  Follow up Blood culture x 2  Oxygen  Duonebs      Psychiatry consulted  , cont 1 : 1 sitter , need psychiatry meds reconciliation           Diet ADULT DIET; Regular; Safety Tray; Safety Tray (Disposables)   DVT Prophylaxis [] Lovenox, []  Heparin, [] SCDs, [] Ambulation,  [] Eliquis, [] Xarelto  [] Coumadin   Code Status Full Code   Disposition From:   Expected Disposition:   Estimated Date of Discharge:   Patient requires continued admission due to    Surrogate Decision Maker/ POA       Personally reviewed Lab Studies and Imaging           Subjective:     Chief Complaint:     Rosaura Lawrence is a 47 y.o. male who presents with       He is sitting in bed  Appears in distress, appears restless and agitated  Sitter in room +  No chest pain     Review of Systems:      Pertinent positives and negatives discussed in HPI    Objective:      Intake/Output Summary (Last 24 hours) at 12/10/2023 1207  Last data filed at 12/10/2023 0647  Gross per 24 hour   Intake 2333.17 ml   Output 1025 ml   Net 1308.17 ml      Vitals:   Vitals:    12/10/23 0812 12/10/23 0814 12/10/23 0900 12/10/23 1126   BP:   139/81    Pulse: (!) 109 (!) 108 99 (!) 110   Resp:  20   Temp:       TempSrc:       SpO2: 97% 100% 98% 97%   Weight:       Height:             Physical Exam:      General: NAD, restless , agitated  Eyes:

## 2023-12-11 LAB
ANION GAP SERPL CALCULATED.3IONS-SCNC: 7 MMOL/L (ref 3–16)
BASOPHILS # BLD: 0 K/UL (ref 0–0.2)
BASOPHILS NFR BLD: 0.1 %
BUN SERPL-MCNC: 7 MG/DL (ref 7–20)
CALCIUM SERPL-MCNC: 9.6 MG/DL (ref 8.3–10.6)
CHLORIDE SERPL-SCNC: 104 MMOL/L (ref 99–110)
CO2 SERPL-SCNC: 29 MMOL/L (ref 21–32)
CREAT SERPL-MCNC: 0.7 MG/DL (ref 0.9–1.3)
DEPRECATED RDW RBC AUTO: 13.5 % (ref 12.4–15.4)
EOSINOPHIL # BLD: 0 K/UL (ref 0–0.6)
EOSINOPHIL NFR BLD: 0 %
GFR SERPLBLD CREATININE-BSD FMLA CKD-EPI: >60 ML/MIN/{1.73_M2}
GLUCOSE SERPL-MCNC: 175 MG/DL (ref 70–99)
HCT VFR BLD AUTO: 32.7 % (ref 40.5–52.5)
HGB BLD-MCNC: 10.9 G/DL (ref 13.5–17.5)
LYMPHOCYTES # BLD: 0.5 K/UL (ref 1–5.1)
LYMPHOCYTES NFR BLD: 5.2 %
MAGNESIUM SERPL-MCNC: 2.1 MG/DL (ref 1.8–2.4)
MCH RBC QN AUTO: 30.3 PG (ref 26–34)
MCHC RBC AUTO-ENTMCNC: 33.4 G/DL (ref 31–36)
MCV RBC AUTO: 90.6 FL (ref 80–100)
MONOCYTES # BLD: 0.4 K/UL (ref 0–1.3)
MONOCYTES NFR BLD: 4 %
NEUTROPHILS # BLD: 9.4 K/UL (ref 1.7–7.7)
NEUTROPHILS NFR BLD: 90.7 %
PHOSPHATE SERPL-MCNC: 2.5 MG/DL (ref 2.5–4.9)
PLATELET # BLD AUTO: 364 K/UL (ref 135–450)
PMV BLD AUTO: 7 FL (ref 5–10.5)
POTASSIUM SERPL-SCNC: 3.9 MMOL/L (ref 3.5–5.1)
RBC # BLD AUTO: 3.61 M/UL (ref 4.2–5.9)
SODIUM SERPL-SCNC: 140 MMOL/L (ref 136–145)
WBC # BLD AUTO: 10.4 K/UL (ref 4–11)

## 2023-12-11 PROCEDURE — 2580000003 HC RX 258: Performed by: HOSPITALIST

## 2023-12-11 PROCEDURE — 6370000000 HC RX 637 (ALT 250 FOR IP): Performed by: INTERNAL MEDICINE

## 2023-12-11 PROCEDURE — 6370000000 HC RX 637 (ALT 250 FOR IP): Performed by: NURSE PRACTITIONER

## 2023-12-11 PROCEDURE — 6360000002 HC RX W HCPCS: Performed by: HOSPITALIST

## 2023-12-11 PROCEDURE — 94760 N-INVAS EAR/PLS OXIMETRY 1: CPT

## 2023-12-11 PROCEDURE — 83735 ASSAY OF MAGNESIUM: CPT

## 2023-12-11 PROCEDURE — 2700000000 HC OXYGEN THERAPY PER DAY

## 2023-12-11 PROCEDURE — 6360000002 HC RX W HCPCS: Performed by: NURSE PRACTITIONER

## 2023-12-11 PROCEDURE — 2060000000 HC ICU INTERMEDIATE R&B

## 2023-12-11 PROCEDURE — 84100 ASSAY OF PHOSPHORUS: CPT

## 2023-12-11 PROCEDURE — 99233 SBSQ HOSP IP/OBS HIGH 50: CPT | Performed by: REGISTERED NURSE

## 2023-12-11 PROCEDURE — 85025 COMPLETE CBC W/AUTO DIFF WBC: CPT

## 2023-12-11 PROCEDURE — 80048 BASIC METABOLIC PNL TOTAL CA: CPT

## 2023-12-11 PROCEDURE — 94640 AIRWAY INHALATION TREATMENT: CPT

## 2023-12-11 PROCEDURE — 6370000000 HC RX 637 (ALT 250 FOR IP): Performed by: REGISTERED NURSE

## 2023-12-11 PROCEDURE — 94660 CPAP INITIATION&MGMT: CPT

## 2023-12-11 PROCEDURE — 36415 COLL VENOUS BLD VENIPUNCTURE: CPT

## 2023-12-11 RX ORDER — QUETIAPINE FUMARATE 50 MG/1
100 TABLET, EXTENDED RELEASE ORAL NIGHTLY
Status: DISCONTINUED | OUTPATIENT
Start: 2023-12-11 | End: 2023-12-13

## 2023-12-11 RX ORDER — BUSPIRONE HYDROCHLORIDE 5 MG/1
5 TABLET ORAL 3 TIMES DAILY
Status: DISCONTINUED | OUTPATIENT
Start: 2023-12-11 | End: 2023-12-13

## 2023-12-11 RX ORDER — TRAZODONE HYDROCHLORIDE 100 MG/1
100 TABLET ORAL NIGHTLY
Status: DISCONTINUED | OUTPATIENT
Start: 2023-12-11 | End: 2023-12-11

## 2023-12-11 RX ORDER — PALIPERIDONE 3 MG/1
3 TABLET, EXTENDED RELEASE ORAL DAILY
Status: DISCONTINUED | OUTPATIENT
Start: 2023-12-11 | End: 2023-12-15 | Stop reason: HOSPADM

## 2023-12-11 RX ADMIN — ENOXAPARIN SODIUM 40 MG: 100 INJECTION SUBCUTANEOUS at 08:07

## 2023-12-11 RX ADMIN — METHYLPREDNISOLONE SODIUM SUCCINATE 40 MG: 40 INJECTION INTRAMUSCULAR; INTRAVENOUS at 00:27

## 2023-12-11 RX ADMIN — Medication 1 MG: at 17:58

## 2023-12-11 RX ADMIN — SODIUM CHLORIDE, PRESERVATIVE FREE 10 ML: 5 INJECTION INTRAVENOUS at 21:52

## 2023-12-11 RX ADMIN — SODIUM CHLORIDE, PRESERVATIVE FREE 10 ML: 5 INJECTION INTRAVENOUS at 08:08

## 2023-12-11 RX ADMIN — BUSPIRONE HYDROCHLORIDE 5 MG: 5 TABLET ORAL at 16:17

## 2023-12-11 RX ADMIN — METHYLPREDNISOLONE SODIUM SUCCINATE 40 MG: 40 INJECTION INTRAMUSCULAR; INTRAVENOUS at 08:07

## 2023-12-11 RX ADMIN — QUETIAPINE FUMARATE 100 MG: 50 TABLET, EXTENDED RELEASE ORAL at 21:51

## 2023-12-11 RX ADMIN — LEVOFLOXACIN 750 MG: 5 INJECTION, SOLUTION INTRAVENOUS at 07:00

## 2023-12-11 RX ADMIN — IPRATROPIUM BROMIDE AND ALBUTEROL SULFATE 1 DOSE: 2.5; .5 SOLUTION RESPIRATORY (INHALATION) at 21:10

## 2023-12-11 RX ADMIN — IPRATROPIUM BROMIDE AND ALBUTEROL SULFATE 1 DOSE: 2.5; .5 SOLUTION RESPIRATORY (INHALATION) at 16:32

## 2023-12-11 RX ADMIN — BUSPIRONE HYDROCHLORIDE 5 MG: 5 TABLET ORAL at 21:51

## 2023-12-11 RX ADMIN — TRAZODONE HYDROCHLORIDE 100 MG: 100 TABLET ORAL at 00:27

## 2023-12-11 RX ADMIN — IPRATROPIUM BROMIDE AND ALBUTEROL SULFATE 1 DOSE: 2.5; .5 SOLUTION RESPIRATORY (INHALATION) at 08:05

## 2023-12-11 RX ADMIN — LABETALOL HYDROCHLORIDE 10 MG: 5 INJECTION INTRAVENOUS at 22:24

## 2023-12-11 RX ADMIN — PALIPERIDONE 3 MG: 3 TABLET, EXTENDED RELEASE ORAL at 16:18

## 2023-12-11 RX ADMIN — IPRATROPIUM BROMIDE AND ALBUTEROL SULFATE 1 DOSE: 2.5; .5 SOLUTION RESPIRATORY (INHALATION) at 12:01

## 2023-12-11 RX ADMIN — METHYLPREDNISOLONE SODIUM SUCCINATE 40 MG: 40 INJECTION INTRAMUSCULAR; INTRAVENOUS at 17:16

## 2023-12-11 RX ADMIN — SODIUM CHLORIDE: 9 INJECTION, SOLUTION INTRAVENOUS at 04:34

## 2023-12-11 ASSESSMENT — PAIN DESCRIPTION - ORIENTATION: ORIENTATION: LEFT

## 2023-12-11 ASSESSMENT — PAIN DESCRIPTION - ONSET: ONSET: ON-GOING

## 2023-12-11 ASSESSMENT — PAIN DESCRIPTION - DESCRIPTORS: DESCRIPTORS: ACHING

## 2023-12-11 ASSESSMENT — PAIN SCALES - GENERAL
PAINLEVEL_OUTOF10: 0

## 2023-12-11 ASSESSMENT — PAIN DESCRIPTION - PAIN TYPE: TYPE: ACUTE PAIN

## 2023-12-11 ASSESSMENT — PAIN DESCRIPTION - FREQUENCY: FREQUENCY: CONTINUOUS

## 2023-12-11 ASSESSMENT — PAIN DESCRIPTION - LOCATION: LOCATION: CHEST

## 2023-12-11 NOTE — PROGRESS NOTES
4 Eyes Skin Assessment     NAME:  Roxane Leyva  YOB: 1970  MEDICAL RECORD NUMBER:  [id number]    The patient is being assessed for  Shift Handoff    I agree that at least one RN has performed a thorough Head to Toe Skin Assessment on the patient. ALL assessment sites listed below have been assessed. Areas assessed by both nurses:    Head, Face, Ears, Shoulders, Back, Chest, Arms, Elbows, Hands, Sacrum. Buttock, Coccyx, Ischium, Legs. Feet and Heels, and Under Medical Devices         Does the Patient have a Wound?  No noted wound(s)       Alon Prevention initiated by RN: No  Wound Care Orders initiated by RN: No    Pressure Injury (Stage 3,4, Unstageable, DTI, NWPT, and Complex wounds) if present, place Wound referral order by RN under : No    New Ostomies, if present place, Ostomy referral order under : No     Nurse 1 eSignature: Electronically signed by Sande Councilman, RN on 12/10/23 at 7:03 PM EST    **SHARE this note so that the co-signing nurse can place an eSignature**    Nurse 2 eSignature: Electronically signed by bAiodun Parker RN on 12/10/23 at 9:23 PM EST

## 2023-12-11 NOTE — PROGRESS NOTES
Administered 1 mg Ativan and wasted 1 mg Ativan per [facility] policy.      Electronically signed by Laine Beck RN on 12/11/2023 at 1:07 AM

## 2023-12-11 NOTE — PROGRESS NOTES
V2.0    [facility] [place] Progress Note      Name:  Homa Anderson /Age/Sex: 1970  (47 y.o. male)   MRN & CSN:  [id number] & [id number] Encounter Date/Time: 2023 12:07 PM EST   Location:  [id number]-8993 PCP: KATY Jackson - CNP     Attending:Lg Worley MD       Hospital Day: 3    Assessment and Recommendations   Homa Anderson is a 47 y.o. male with pmh of  who presents with Acute respiratory failure ([address])      Acute respiratory failure with hypoxia  H/o polysubstance abuse   Bipolar DO  PTSD    Plan    Telemetry monitering  Pulmonary consult   Empiric IV antibiotics : levaquin  IV steroids  Follow up Blood culture x 2 : NGTD  Oxygen  Duonebs      Psychiatry consulted  , cont 1 : 1 sitter , need psychiatry meds reconciliation           Diet ADULT DIET; Regular; Safety Tray; Safety Tray (Disposables)   DVT Prophylaxis [] Lovenox, []  Heparin, [] SCDs, [] Ambulation,  [] Eliquis, [] Xarelto  [] Coumadin   Code Status Full Code   Disposition From:   Expected Disposition:   Estimated Date of Discharge:   Patient requires continued admission due to    Surrogate Decision Maker/ POA       Personally reviewed Lab Studies and Imaging           Subjective:     Chief Complaint:     Homa Anderson is a 47 y.o. male who presents with     On CPAP today am  He is sitting in bed  Sitter in room +  No chest pain     Review of Systems:      Pertinent positives and negatives discussed in HPI    Objective:      Intake/Output Summary (Last 24 hours) at 2023 1129  Last data filed at 2023 0346  Gross per 24 hour   Intake 240 ml   Output 2500 ml   Net -2260 ml        Vitals:   Vitals:    23 0805 23 0806 23 0900 23 1000   BP:   128/78 (!) 139/98   Pulse: 97 85 84 85   Resp: 26 14 13 13   Temp:       TempSrc:       SpO2: 99% 99% 97% 96%   Weight:       Height:             Physical Exam:      General: NAD,  on CPAP +  Eyes: EOMI  ENT: neck supple  Cardiovascular: Regular

## 2023-12-11 NOTE — PROGRESS NOTES
RN walked into patients room for assessment. Pt was naked, sitting on the side of the bed with his inhaler \"trying to catch his breath\". RN asked pt why he was naked, pt stated \" I am allowed to be naked if I want to be\". O2 saturation 96%. Bed in lowest position, 1:1 sitter at bedside for continued suicide precautions.

## 2023-12-11 NOTE — PROGRESS NOTES
4 Eyes Skin Assessment     NAME:  Maura Giraldo  YOB: 1970  MEDICAL RECORD NUMBER:  [id number]    The patient is being assessed for  Transfer to New Unit    I agree that at least one RN has performed a thorough Head to Toe Skin Assessment on the patient. ALL assessment sites listed below have been assessed. Areas assessed by both nurses:    Head, Face, Ears, Shoulders, Back, Chest, Arms, Elbows, Hands, Sacrum. Buttock, Coccyx, Ischium, Legs. Feet and Heels, and Under Medical Devices         Does the Patient have a Wound?  No noted wound(s)       Alon Prevention initiated by RN: Yes  Wound Care Orders initiated by RN: No    Pressure Injury (Stage 3,4, Unstageable, DTI, NWPT, and Complex wounds) if present, place Wound referral order by RN under : No    New Ostomies, if present place, Ostomy referral order under : No     Nurse 1 eSignature: Electronically signed by Jaycob Villasenor RN on 12/11/23 at 5:20 PM EST    **SHARE this note so that the co-signing nurse can place an eSignature**    Nurse 2 eSignature: {Esignature:[id number]}

## 2023-12-11 NOTE — PROGRESS NOTES
LVM for patient regarding referral and scheduling for  who has replaced . Provided [facility] number for scheduling options.    Report called to [facility]. Pt transferred to [facility] via bed with bipap on.

## 2023-12-11 NOTE — PLAN OF CARE
Problem: Discharge Planning  Goal: Discharge to home or other facility with appropriate resources  12/10/2023 2106 by Joann Sharma RN  Outcome: Progressing  Flowsheets (Taken 12/10/2023 2000)  Discharge to home or other facility with appropriate resources:   Identify barriers to discharge with patient and caregiver   Arrange for needed discharge resources and transportation as appropriate     Problem: Pain  Goal: Verbalizes/displays adequate comfort level or baseline comfort level  12/10/2023 2106 by Joann Sharma RN  Outcome: Progressing     Problem: Safety - Adult  Goal: Free from fall injury  12/10/2023 2106 by Joann Sharma RN  Outcome: Progressing     Problem: Respiratory - Adult  Goal: Achieves optimal ventilation and oxygenation  12/10/2023 2106 by Joann Sharma RN  Outcome: Progressing  Flowsheets (Taken 12/10/2023 2000)  Achieves optimal ventilation and oxygenation:   Assess for changes in respiratory status   Assess for changes in mentation and behavior     Problem: Musculoskeletal - Adult  Goal: Return mobility to safest level of function  12/10/2023 2106 by Joann Sharma RN  Outcome: Progressing  Flowsheets (Taken 12/10/2023 2000)  Return Mobility to Safest Level of Function:   Assess patient stability and activity tolerance for standing, transferring and ambulating with or without assistive devices   Assist with transfers and ambulation using safe patient handling equipment as needed     Problem: Infection - Adult  Goal: Absence of infection at discharge  12/10/2023 2106 by Joann Sharma RN  Outcome: Progressing  Flowsheets (Taken 12/10/2023 2000)  Absence of infection at discharge:   Assess and monitor for signs and symptoms of infection   Monitor lab/diagnostic results     Problem: Chronic Conditions and Co-morbidities  Goal: Patient's chronic conditions and co-morbidity symptoms are monitored and maintained or improved  12/10/2023 2106 by Joann Sharma, RN  Outcome: Progressing  Flowsheets (Taken  12/10/2023 2000)  Care Plan - Patient's Chronic Conditions and Co-Morbidity Symptoms are Monitored and Maintained or Improved: Monitor and assess patient's chronic conditions and comorbid symptoms for stability, deterioration, or improvement

## 2023-12-11 NOTE — CARE COORDINATION
SW following, psyc following. Unsure what dc plan at this point will be.   Pt has own apt  Electronically signed by KRYSTLE Snow on 12/11/2023 at 9:35 AM

## 2023-12-11 NOTE — PROGRESS NOTES
PSYCHIATRY CONSULT PROGRESS NOTE    12/11/2023  Korey Diggs  [id number]  Referring Provider:  Bridget Segovia MD    CC/Reason for Consult: Bipolar d/o, schizophrenia     Impression/diagnosis     Methamphetamine use disorder      2. Anxiety, NOS     Recommendations:     Psychiatric     Continue 1:1 bed side sitter. I could not able to assess patient for suicidal ideation as patient having increased work of breathing and coughing. 2. Called the patient's pharmacy at [facility] and talked with [facility] to verify patient' psychotropic medications. Per pharmacy patient takes Invega  4.5 mg/daily, Prozac 20 mg/daily, Seroquel  mg at HS, Cogentin 0.5 mg BID, Buspar 15 mg TID and Trazodone 100 mg at HS. 3. Resume home psychotropic medications at gentle dose: Invega 3 mg/daily, Seroquel  mg/nightly, Buspar 5 mg TID. Increase doses as patient's respiratory issues resolve in the coming days. Monitor for sedation. 4. Will follow along with the patient. Dispo: Pending     S:Seen a room and sitter at bed side for suicide precautions. Reports he did not sleep last night. Noted increased work of breathing VS unstable. He was acting inappropriately last night ( trying to sit naked on the floor in a room per RN documentation.)     Behavior issues in last 24hours: Received Ativan this morning. ROS: Hyperventilation, restlessness.      PMH/SH/ reviewed and no changes     traZODone  100 mg Oral Nightly    albuterol  2.5 mg Nebulization Once    levofloxacin  750 mg IntraVENous Q24H    methylPREDNISolone  40 mg IntraVENous Q8H    sodium chloride flush  5-40 mL IntraVENous 2 times per day    enoxaparin  40 mg SubCUTAneous Daily    ipratropium 0.5 mg-albuterol 2.5 mg  1 Dose Inhalation Q4H WA RT     guaiFENesin-dextromethorphan, labetalol, LORazepam, sodium chloride flush, sodium chloride, potassium chloride **OR** potassium alternative oral replacement **OR** potassium chloride, magnesium

## 2023-12-12 PROBLEM — F41.1 ANXIETY STATE: Status: ACTIVE | Noted: 2023-12-12

## 2023-12-12 LAB
ANION GAP SERPL CALCULATED.3IONS-SCNC: 8 MMOL/L (ref 3–16)
BASOPHILS # BLD: 0 K/UL (ref 0–0.2)
BASOPHILS NFR BLD: 0.2 %
BUN SERPL-MCNC: 11 MG/DL (ref 7–20)
CALCIUM SERPL-MCNC: 9.1 MG/DL (ref 8.3–10.6)
CHLORIDE SERPL-SCNC: 104 MMOL/L (ref 99–110)
CO2 SERPL-SCNC: 30 MMOL/L (ref 21–32)
CREAT SERPL-MCNC: 0.8 MG/DL (ref 0.9–1.3)
DEPRECATED RDW RBC AUTO: 13.5 % (ref 12.4–15.4)
EOSINOPHIL # BLD: 0 K/UL (ref 0–0.6)
EOSINOPHIL NFR BLD: 0 %
GFR SERPLBLD CREATININE-BSD FMLA CKD-EPI: >60 ML/MIN/{1.73_M2}
GLUCOSE SERPL-MCNC: 148 MG/DL (ref 70–99)
HCT VFR BLD AUTO: 31.7 % (ref 40.5–52.5)
HGB BLD-MCNC: 10.8 G/DL (ref 13.5–17.5)
LYMPHOCYTES # BLD: 0.6 K/UL (ref 1–5.1)
LYMPHOCYTES NFR BLD: 6.1 %
MAGNESIUM SERPL-MCNC: 2.2 MG/DL (ref 1.8–2.4)
MCH RBC QN AUTO: 30.7 PG (ref 26–34)
MCHC RBC AUTO-ENTMCNC: 34 G/DL (ref 31–36)
MCV RBC AUTO: 90.2 FL (ref 80–100)
MONOCYTES # BLD: 0.5 K/UL (ref 0–1.3)
MONOCYTES NFR BLD: 5.6 %
NEUTROPHILS # BLD: 8.6 K/UL (ref 1.7–7.7)
NEUTROPHILS NFR BLD: 88.1 %
PHOSPHATE SERPL-MCNC: 2.8 MG/DL (ref 2.5–4.9)
PLATELET # BLD AUTO: 339 K/UL (ref 135–450)
PMV BLD AUTO: 6.8 FL (ref 5–10.5)
POTASSIUM SERPL-SCNC: 3.8 MMOL/L (ref 3.5–5.1)
RBC # BLD AUTO: 3.52 M/UL (ref 4.2–5.9)
SODIUM SERPL-SCNC: 142 MMOL/L (ref 136–145)
WBC # BLD AUTO: 9.8 K/UL (ref 4–11)

## 2023-12-12 PROCEDURE — 85025 COMPLETE CBC W/AUTO DIFF WBC: CPT

## 2023-12-12 PROCEDURE — 2580000003 HC RX 258

## 2023-12-12 PROCEDURE — 99999 PR OFFICE/OUTPT VISIT,PROCEDURE ONLY: CPT | Performed by: NURSE PRACTITIONER

## 2023-12-12 PROCEDURE — 6370000000 HC RX 637 (ALT 250 FOR IP): Performed by: INTERNAL MEDICINE

## 2023-12-12 PROCEDURE — 2060000000 HC ICU INTERMEDIATE R&B

## 2023-12-12 PROCEDURE — 6360000002 HC RX W HCPCS: Performed by: HOSPITALIST

## 2023-12-12 PROCEDURE — 83735 ASSAY OF MAGNESIUM: CPT

## 2023-12-12 PROCEDURE — 84100 ASSAY OF PHOSPHORUS: CPT

## 2023-12-12 PROCEDURE — 6370000000 HC RX 637 (ALT 250 FOR IP): Performed by: HOSPITALIST

## 2023-12-12 PROCEDURE — 6370000000 HC RX 637 (ALT 250 FOR IP): Performed by: REGISTERED NURSE

## 2023-12-12 PROCEDURE — 36415 COLL VENOUS BLD VENIPUNCTURE: CPT

## 2023-12-12 PROCEDURE — 6370000000 HC RX 637 (ALT 250 FOR IP): Performed by: NURSE PRACTITIONER

## 2023-12-12 PROCEDURE — 94640 AIRWAY INHALATION TREATMENT: CPT

## 2023-12-12 PROCEDURE — 2580000003 HC RX 258: Performed by: HOSPITALIST

## 2023-12-12 PROCEDURE — 80048 BASIC METABOLIC PNL TOTAL CA: CPT

## 2023-12-12 PROCEDURE — 2700000000 HC OXYGEN THERAPY PER DAY

## 2023-12-12 PROCEDURE — 94761 N-INVAS EAR/PLS OXIMETRY MLT: CPT

## 2023-12-12 PROCEDURE — 94660 CPAP INITIATION&MGMT: CPT

## 2023-12-12 RX ORDER — ATORVASTATIN CALCIUM 40 MG/1
40 TABLET, FILM COATED ORAL DAILY
COMMUNITY

## 2023-12-12 RX ORDER — LOSARTAN POTASSIUM 50 MG/1
50 TABLET ORAL DAILY
COMMUNITY

## 2023-12-12 RX ORDER — BENZONATATE 100 MG/1
100 CAPSULE ORAL 3 TIMES DAILY PRN
Status: DISCONTINUED | OUTPATIENT
Start: 2023-12-12 | End: 2023-12-15 | Stop reason: HOSPADM

## 2023-12-12 RX ORDER — BENZTROPINE MESYLATE 0.5 MG/1
0.5 TABLET ORAL 2 TIMES DAILY
COMMUNITY
Start: 2023-11-20

## 2023-12-12 RX ORDER — AZELASTINE HYDROCHLORIDE 0.5 MG/ML
2 SOLUTION/ DROPS OPHTHALMIC DAILY
COMMUNITY
Start: 2023-11-15

## 2023-12-12 RX ORDER — CHOLECALCIFEROL (VITAMIN D3) 125 MCG
2000 CAPSULE ORAL DAILY
COMMUNITY
Start: 2023-11-20

## 2023-12-12 RX ORDER — LANOLIN ALCOHOL/MO/W.PET/CERES
1000 CREAM (GRAM) TOPICAL DAILY
COMMUNITY
Start: 2023-12-06

## 2023-12-12 RX ORDER — BUSPIRONE HYDROCHLORIDE 15 MG/1
15 TABLET ORAL 3 TIMES DAILY
Status: ON HOLD | COMMUNITY
Start: 2023-11-20 | End: 2023-12-15 | Stop reason: HOSPADM

## 2023-12-12 RX ORDER — QUETIAPINE 400 MG/1
400 TABLET, FILM COATED, EXTENDED RELEASE ORAL NIGHTLY
Status: ON HOLD | COMMUNITY
Start: 2023-11-20 | End: 2023-12-15 | Stop reason: HOSPADM

## 2023-12-12 RX ORDER — DOXYCYCLINE HYCLATE 100 MG
100 TABLET ORAL 2 TIMES DAILY
Status: ON HOLD | COMMUNITY
Start: 2023-12-09 | End: 2023-12-15 | Stop reason: HOSPADM

## 2023-12-12 RX ORDER — PALIPERIDONE 1.5 MG/1
4.5 TABLET, EXTENDED RELEASE ORAL EVERY MORNING
Status: ON HOLD | COMMUNITY
Start: 2023-11-20 | End: 2023-12-15 | Stop reason: HOSPADM

## 2023-12-12 RX ORDER — VARENICLINE TARTRATE 1 MG/1
1 TABLET, FILM COATED ORAL 2 TIMES DAILY
COMMUNITY
Start: 2023-11-06

## 2023-12-12 RX ORDER — CHOLECALCIFEROL (VITAMIN D3) 125 MCG
5 CAPSULE ORAL NIGHTLY
COMMUNITY
Start: 2023-11-20

## 2023-12-12 RX ORDER — FLUOXETINE HYDROCHLORIDE 20 MG/1
20 CAPSULE ORAL DAILY
COMMUNITY
Start: 2023-11-20

## 2023-12-12 RX ORDER — TRAZODONE HYDROCHLORIDE 100 MG/1
100 TABLET ORAL NIGHTLY
Status: ON HOLD | COMMUNITY
End: 2023-12-15 | Stop reason: HOSPADM

## 2023-12-12 RX ORDER — LEVOTHYROXINE SODIUM 0.07 MG/1
75 TABLET ORAL
COMMUNITY
Start: 2021-08-10

## 2023-12-12 RX ORDER — LIDOCAINE 50 MG/G
1 PATCH TOPICAL EVERY 24 HOURS
COMMUNITY

## 2023-12-12 RX ORDER — FLUTICASONE FUROATE, UMECLIDINIUM BROMIDE AND VILANTEROL TRIFENATATE 100; 62.5; 25 UG/1; UG/1; UG/1
1 POWDER RESPIRATORY (INHALATION) DAILY
COMMUNITY
Start: 2023-03-13

## 2023-12-12 RX ORDER — GUAIFENESIN 600 MG/1
600 TABLET, EXTENDED RELEASE ORAL 2 TIMES DAILY
COMMUNITY
Start: 2023-11-06

## 2023-12-12 RX ORDER — CETIRIZINE HYDROCHLORIDE 10 MG/1
10 TABLET ORAL DAILY
COMMUNITY
Start: 2023-11-20

## 2023-12-12 RX ORDER — WATER 10 ML/10ML
INJECTION INTRAMUSCULAR; INTRAVENOUS; SUBCUTANEOUS
Status: COMPLETED
Start: 2023-12-12 | End: 2023-12-12

## 2023-12-12 RX ORDER — OMEPRAZOLE 40 MG/1
40 CAPSULE, DELAYED RELEASE ORAL
COMMUNITY

## 2023-12-12 RX ORDER — ALBUTEROL SULFATE 90 UG/1
2 AEROSOL, METERED RESPIRATORY (INHALATION) EVERY 6 HOURS PRN
COMMUNITY
Start: 2021-08-10

## 2023-12-12 RX ADMIN — GUAIFENESIN SYRUP AND DEXTROMETHORPHAN 10 ML: 100; 10 SYRUP ORAL at 15:34

## 2023-12-12 RX ADMIN — IPRATROPIUM BROMIDE AND ALBUTEROL SULFATE 1 DOSE: 2.5; .5 SOLUTION RESPIRATORY (INHALATION) at 20:15

## 2023-12-12 RX ADMIN — METHYLPREDNISOLONE SODIUM SUCCINATE 40 MG: 40 INJECTION INTRAMUSCULAR; INTRAVENOUS at 10:26

## 2023-12-12 RX ADMIN — ENOXAPARIN SODIUM 40 MG: 100 INJECTION SUBCUTANEOUS at 10:25

## 2023-12-12 RX ADMIN — BUSPIRONE HYDROCHLORIDE 5 MG: 5 TABLET ORAL at 21:09

## 2023-12-12 RX ADMIN — METHYLPREDNISOLONE SODIUM SUCCINATE 40 MG: 40 INJECTION INTRAMUSCULAR; INTRAVENOUS at 16:46

## 2023-12-12 RX ADMIN — WATER 10 ML: 1 INJECTION INTRAMUSCULAR; INTRAVENOUS; SUBCUTANEOUS at 10:26

## 2023-12-12 RX ADMIN — Medication 1 SPRAY: at 21:09

## 2023-12-12 RX ADMIN — IPRATROPIUM BROMIDE AND ALBUTEROL SULFATE 1 DOSE: 2.5; .5 SOLUTION RESPIRATORY (INHALATION) at 12:23

## 2023-12-12 RX ADMIN — SODIUM CHLORIDE, PRESERVATIVE FREE 10 ML: 5 INJECTION INTRAVENOUS at 10:26

## 2023-12-12 RX ADMIN — IPRATROPIUM BROMIDE AND ALBUTEROL SULFATE 1 DOSE: 2.5; .5 SOLUTION RESPIRATORY (INHALATION) at 08:17

## 2023-12-12 RX ADMIN — LEVOFLOXACIN 750 MG: 5 INJECTION, SOLUTION INTRAVENOUS at 06:44

## 2023-12-12 RX ADMIN — WATER 10 ML: 1 INJECTION INTRAMUSCULAR; INTRAVENOUS; SUBCUTANEOUS at 16:46

## 2023-12-12 RX ADMIN — SODIUM CHLORIDE, PRESERVATIVE FREE 10 ML: 5 INJECTION INTRAVENOUS at 21:09

## 2023-12-12 RX ADMIN — BENZONATATE 100 MG: 100 CAPSULE ORAL at 21:09

## 2023-12-12 RX ADMIN — QUETIAPINE FUMARATE 100 MG: 50 TABLET, EXTENDED RELEASE ORAL at 21:09

## 2023-12-12 RX ADMIN — Medication 1 MG: at 10:26

## 2023-12-12 RX ADMIN — BUSPIRONE HYDROCHLORIDE 5 MG: 5 TABLET ORAL at 14:06

## 2023-12-12 RX ADMIN — METHYLPREDNISOLONE SODIUM SUCCINATE 40 MG: 40 INJECTION INTRAMUSCULAR; INTRAVENOUS at 00:55

## 2023-12-12 RX ADMIN — BENZONATATE 100 MG: 100 CAPSULE ORAL at 14:06

## 2023-12-12 RX ADMIN — PALIPERIDONE 3 MG: 3 TABLET, EXTENDED RELEASE ORAL at 10:25

## 2023-12-12 RX ADMIN — BUSPIRONE HYDROCHLORIDE 5 MG: 5 TABLET ORAL at 10:25

## 2023-12-12 RX ADMIN — Medication 1 MG: at 01:54

## 2023-12-12 RX ADMIN — IPRATROPIUM BROMIDE AND ALBUTEROL SULFATE 1 DOSE: 2.5; .5 SOLUTION RESPIRATORY (INHALATION) at 16:55

## 2023-12-12 RX ADMIN — Medication 1 MG: at 21:09

## 2023-12-12 RX ADMIN — Medication 10 ML: at 16:47

## 2023-12-12 RX ADMIN — GUAIFENESIN SYRUP AND DEXTROMETHORPHAN 10 ML: 100; 10 SYRUP ORAL at 10:35

## 2023-12-12 ASSESSMENT — PAIN SCALES - GENERAL
PAINLEVEL_OUTOF10: 0

## 2023-12-12 NOTE — PROGRESS NOTES
Attempted to see patient today. He is currently on Bipap machine. Difficult to discuss any suicidal ideation or depression today. Psych will continue to follow as patient becomes more appropriate for discharge. His anxiety and depression may resolve with health improvements with breathing issues and recent methamphetamine use improves. Continue current medication regiment. Henry Alvarado PMHNP-CNP

## 2023-12-12 NOTE — FLOWSHEET NOTE
12/12/23 1342   Treatment Team Notification   Reason for Communication   (UNCONTROLLED NP COUGHING, HEART RATE 140'S. RECEIVED PRN ROBITUSSEN APPROX 3 HOURS AGO. REQUESTING ADDITIONAL COUGH MEDICATION.)   Name of Team Member Notified DR. VALDIVIA   Treatment Team Role Attending Provider   Method of Communication Secure Message   Response Waiting for response   Notification Time 1346     [facility] [address] [facility].

## 2023-12-12 NOTE — PLAN OF CARE
Problem: Discharge Planning  Goal: Discharge to home or other facility with appropriate resources  Outcome: Progressing     Problem: Pain  Goal: Verbalizes/displays adequate comfort level or baseline comfort level  Outcome: Progressing     Problem: Safety - Adult  Goal: Free from fall injury  Outcome: Progressing     Problem: Respiratory - Adult  Goal: Achieves optimal ventilation and oxygenation  Outcome: Progressing     Problem: Musculoskeletal - Adult  Goal: Return mobility to safest level of function  Outcome: Progressing     Problem: Infection - Adult  Goal: Absence of infection at discharge  Outcome: Progressing     Problem: Chronic Conditions and Co-morbidities  Goal: Patient's chronic conditions and co-morbidity symptoms are monitored and maintained or improved  Outcome: Progressing     Problem: Confusion  Goal: Confusion, delirium, dementia, or psychosis is improved or at baseline  Description: INTERVENTIONS:  1. Assess for possible contributors to thought disturbance, including medications, impaired vision or hearing, underlying metabolic abnormalities, dehydration, psychiatric diagnoses, and notify attending LIP  2. [place] high risk fall precautions, as indicated  3. Provide frequent short contacts to provide reality reorientation, refocusing and direction  4. Decrease environmental stimuli, including noise as appropriate  5. Monitor and intervene to maintain adequate nutrition, hydration, elimination, sleep and activity  6. If unable to ensure safety without constant attention obtain sitter and review sitter guidelines with assigned personnel  7. Initiate Psychosocial CNS and Spiritual Care consult, as indicated  Outcome: Progressing   Alert and oriented x4 Dyspnic with exertion Sats. WNL on BIPAP as directed Lungs with some expir. Wheezes Cough and deep breathing exercises ewncouraged No c/o pain Cont.  Per bathroom with SBA Sitter at bedside Free from fall/injury Turns and repositions self

## 2023-12-12 NOTE — PLAN OF CARE
Problem: Discharge Planning  Goal: Discharge to home or other facility with appropriate resources  12/12/2023 1241 by Michael Monahan RN  Outcome: Progressing     Problem: Pain  Goal: Verbalizes/displays adequate comfort level or baseline comfort level  12/12/2023 1241 by Michael Monahan RN  Outcome: Progressing     Problem: Safety - Adult  Goal: Free from fall injury  12/12/2023 1241 by Michael Monahan RN  Outcome: Progressing     Problem: Respiratory - Adult  Goal: Achieves optimal ventilation and oxygenation  12/12/2023 1241 by Michael Monahan RN  Outcome: Progressing     Problem: Musculoskeletal - Adult  Goal: Return mobility to safest level of function  12/12/2023 1241 by Michael Monahan RN  Outcome: Progressing     Problem: Infection - Adult  Goal: Absence of infection at discharge  12/12/2023 1241 by Michael Monahan RN  Outcome: Progressing     Problem: Chronic Conditions and Co-morbidities  Goal: Patient's chronic conditions and co-morbidity symptoms are monitored and maintained or improved  12/12/2023 1241 by Michael Monahan RN  Outcome: Progressing     Problem: Confusion  Goal: Confusion, delirium, dementia, or psychosis is improved or at baseline  Description: INTERVENTIONS:  1. Assess for possible contributors to thought disturbance, including medications, impaired vision or hearing, underlying metabolic abnormalities, dehydration, psychiatric diagnoses, and notify attending LIP  2. [place] high risk fall precautions, as indicated  3. Provide frequent short contacts to provide reality reorientation, refocusing and direction  4. Decrease environmental stimuli, including noise as appropriate  5. Monitor and intervene to maintain adequate nutrition, hydration, elimination, sleep and activity  6. If unable to ensure safety without constant attention obtain sitter and review sitter guidelines with assigned personnel  7.  Initiate Psychosocial CNS and Spiritual Care consult, as indicated  12/12/2023 1241 by

## 2023-12-12 NOTE — CARE COORDINATION
Case Management Assessment  Initial Evaluation    Date/Time of Evaluation: 12/12/2023 3:21 PM  Assessment Completed by: KRYSTLE Jean Baptiste    If patient is discharged prior to next notation, then this note serves as note for discharge by case management. Patient Name: Kirstin Slater                   YOB: 1970  Diagnosis: Acute respiratory failure ([address]) [J96.00]  Shortness of breath [R06.02]  Anxiety state [F41.1]  COPD exacerbation ([address]) [J44.1]  Chest pain, unspecified type [R07.9]                   Date / Time: 12/9/2023  3:42 AM    Patient Admission Status: Inpatient   Readmission Risk (Low < 19, Mod (19-27), High > 27): Readmission Risk Score: 11.8    Current PCP: KATY Ritchie CNP  PCP verified by CM? (P) Yes    Chart Reviewed: Yes      History Provided by: (P) Patient  Patient Orientation: (P) Alert and Oriented    Patient Cognition: (P) Alert    Hospitalization in the last 30 days (Readmission):  No    If yes, Readmission Assessment in CM Navigator will be completed.     Advance Directives:      Code Status: Full Code   Patient's Primary Decision Maker is: (P) Legal Next of Kin      Discharge Planning:    Patient lives with: (P) Alone Type of Home: (P) House  Primary Care Giver: (P) Self  Patient Support Systems include: (P) Friends/Neighbors   Current Financial resources: (P) None  Current community resources: (P) None  Current services prior to admission: (P) None            Current DME:              Type of Home Care services:  (P) None    ADLS  Prior functional level: (P) Independent in ADLs/IADLs  Current functional level: (P) Independent in ADLs/IADLs    PT AM-PAC:   /24  OT AM-PAC:   /24    Family can provide assistance at DC: (P) No  Would you like Case Management to discuss the discharge plan with any other family members/significant others, and if so, who? (P) No  Plans to Return to Present Housing: (P) Yes  Other Identified Issues/Barriers to RETURNING to current

## 2023-12-12 NOTE — PROGRESS NOTES
V2.0    [facility] [place]      Name:  Lupe Gaitan /Age/Sex: 1970  (47 y.o. male)   MRN & CSN:  [id number] & [id number] Encounter Date/Time: 2023 12:07 PM EST   Location:  [id number] PCP: KATY Morrissey CNP     Attending:Sarah Worley MD       Hospital Day: 4    Assessment and Recommendations   Lupe Gaitan is a 47 y.o. male with pmh of  who presents with Acute respiratory failure ([address])      Acute respiratory failure with hypoxia  H/o polysubstance abuse   Bipolar DO  PTSD    Plan    Telemetry monitering  Pulmonary consult : respiratory status stable  On CPAP prn  Empiric IV antibiotics : levaquin  IV steroids  Follow up Blood culture x 2 : NGTD  Oxygen  Duonebs      Psychiatry consulted  , cont 1 : 1 sitter , need psychiatry meds reconciliation    Invega 3 mg/daily, Seroquel  mg/nightly, Buspar 5 mg TID. Increase doses as patient's respiratory issues resolve in the coming days. Monitor for sedation. Diet ADULT DIET; Regular; Safety Tray; Safety Tray (Disposables)   DVT Prophylaxis Invega 3 mg/daily, Seroquel  mg/nightly, Buspar 5 mg TID. Increase doses as patient's respiratory issues resolve in the coming days. Monitor for sedation. [] Lovenox, []  Heparin, [] SCDs, [] Ambulation,  [] Eliquis, [] Xarelto  [] Coumadin   Code Status Full Code   Disposition From:   Expected Disposition:   Estimated Date of Discharge:   Patient requires continued admission due to    Surrogate Decision Maker/ POA       Personally reviewed Lab Studies and Imaging           Subjective:     Chief Complaint:     Lupe Gaitan is a 47 y.o. male who presents with     On CPAP today am  He is sitting in bed  Sitter in room +  No chest pain     Review of Systems:      Pertinent positives and negatives discussed in HPI    Objective:      Intake/Output Summary (Last 24 hours) at 2023 1214  Last data filed at 2023 1043  Gross per 24 hour   Intake 780 ml   Output 1700 ml

## 2023-12-13 LAB
BACTERIA BLD CULT ORG #2: NORMAL
BACTERIA BLD CULT: NORMAL
MAGNESIUM SERPL-MCNC: 2.4 MG/DL (ref 1.8–2.4)
PHOSPHATE SERPL-MCNC: 3.1 MG/DL (ref 2.5–4.9)

## 2023-12-13 PROCEDURE — 6370000000 HC RX 637 (ALT 250 FOR IP): Performed by: HOSPITALIST

## 2023-12-13 PROCEDURE — 2060000000 HC ICU INTERMEDIATE R&B

## 2023-12-13 PROCEDURE — 6370000000 HC RX 637 (ALT 250 FOR IP): Performed by: NURSE PRACTITIONER

## 2023-12-13 PROCEDURE — 83735 ASSAY OF MAGNESIUM: CPT

## 2023-12-13 PROCEDURE — 84100 ASSAY OF PHOSPHORUS: CPT

## 2023-12-13 PROCEDURE — 6370000000 HC RX 637 (ALT 250 FOR IP): Performed by: INTERNAL MEDICINE

## 2023-12-13 PROCEDURE — 6360000002 HC RX W HCPCS: Performed by: HOSPITALIST

## 2023-12-13 PROCEDURE — 2580000003 HC RX 258: Performed by: HOSPITALIST

## 2023-12-13 PROCEDURE — 2580000003 HC RX 258

## 2023-12-13 PROCEDURE — 2700000000 HC OXYGEN THERAPY PER DAY

## 2023-12-13 PROCEDURE — 99232 SBSQ HOSP IP/OBS MODERATE 35: CPT | Performed by: NURSE PRACTITIONER

## 2023-12-13 PROCEDURE — 94640 AIRWAY INHALATION TREATMENT: CPT

## 2023-12-13 PROCEDURE — 6360000002 HC RX W HCPCS: Performed by: NURSE PRACTITIONER

## 2023-12-13 PROCEDURE — 94761 N-INVAS EAR/PLS OXIMETRY MLT: CPT

## 2023-12-13 PROCEDURE — 6370000000 HC RX 637 (ALT 250 FOR IP): Performed by: REGISTERED NURSE

## 2023-12-13 PROCEDURE — 36415 COLL VENOUS BLD VENIPUNCTURE: CPT

## 2023-12-13 RX ORDER — FLUOXETINE HYDROCHLORIDE 20 MG/1
20 CAPSULE ORAL DAILY
Status: DISCONTINUED | OUTPATIENT
Start: 2023-12-13 | End: 2023-12-15 | Stop reason: HOSPADM

## 2023-12-13 RX ORDER — PREDNISONE 20 MG/1
40 TABLET ORAL DAILY
Status: COMPLETED | OUTPATIENT
Start: 2023-12-13 | End: 2023-12-15

## 2023-12-13 RX ORDER — BUSPIRONE HYDROCHLORIDE 10 MG/1
10 TABLET ORAL 3 TIMES DAILY
Status: DISCONTINUED | OUTPATIENT
Start: 2023-12-13 | End: 2023-12-15 | Stop reason: HOSPADM

## 2023-12-13 RX ORDER — TRAZODONE HYDROCHLORIDE 50 MG/1
50 TABLET ORAL NIGHTLY
Status: DISCONTINUED | OUTPATIENT
Start: 2023-12-13 | End: 2023-12-14

## 2023-12-13 RX ORDER — WATER 10 ML/10ML
INJECTION INTRAMUSCULAR; INTRAVENOUS; SUBCUTANEOUS
Status: COMPLETED
Start: 2023-12-13 | End: 2023-12-13

## 2023-12-13 RX ADMIN — Medication 1 MG: at 19:11

## 2023-12-13 RX ADMIN — IPRATROPIUM BROMIDE AND ALBUTEROL SULFATE 1 DOSE: 2.5; .5 SOLUTION RESPIRATORY (INHALATION) at 15:42

## 2023-12-13 RX ADMIN — FLUOXETINE 20 MG: 20 CAPSULE ORAL at 13:38

## 2023-12-13 RX ADMIN — BUSPIRONE HYDROCHLORIDE 5 MG: 5 TABLET ORAL at 09:18

## 2023-12-13 RX ADMIN — QUETIAPINE FUMARATE 200 MG: 150 TABLET, EXTENDED RELEASE ORAL at 20:58

## 2023-12-13 RX ADMIN — PREDNISONE 40 MG: 20 TABLET ORAL at 13:39

## 2023-12-13 RX ADMIN — BENZONATATE 100 MG: 100 CAPSULE ORAL at 13:38

## 2023-12-13 RX ADMIN — LABETALOL HYDROCHLORIDE 10 MG: 5 INJECTION INTRAVENOUS at 22:07

## 2023-12-13 RX ADMIN — WATER 10 ML: 1 INJECTION INTRAMUSCULAR; INTRAVENOUS; SUBCUTANEOUS at 09:22

## 2023-12-13 RX ADMIN — LEVOFLOXACIN 750 MG: 5 INJECTION, SOLUTION INTRAVENOUS at 06:29

## 2023-12-13 RX ADMIN — PALIPERIDONE 3 MG: 3 TABLET, EXTENDED RELEASE ORAL at 09:18

## 2023-12-13 RX ADMIN — IPRATROPIUM BROMIDE AND ALBUTEROL SULFATE 1 DOSE: 2.5; .5 SOLUTION RESPIRATORY (INHALATION) at 07:44

## 2023-12-13 RX ADMIN — SODIUM CHLORIDE, PRESERVATIVE FREE 10 ML: 5 INJECTION INTRAVENOUS at 19:14

## 2023-12-13 RX ADMIN — GUAIFENESIN SYRUP AND DEXTROMETHORPHAN 10 ML: 100; 10 SYRUP ORAL at 18:53

## 2023-12-13 RX ADMIN — BUSPIRONE HYDROCHLORIDE 10 MG: 10 TABLET ORAL at 20:58

## 2023-12-13 RX ADMIN — BUSPIRONE HYDROCHLORIDE 10 MG: 10 TABLET ORAL at 13:38

## 2023-12-13 RX ADMIN — IPRATROPIUM BROMIDE AND ALBUTEROL SULFATE 1 DOSE: 2.5; .5 SOLUTION RESPIRATORY (INHALATION) at 12:08

## 2023-12-13 RX ADMIN — METHYLPREDNISOLONE SODIUM SUCCINATE 40 MG: 40 INJECTION INTRAMUSCULAR; INTRAVENOUS at 01:43

## 2023-12-13 RX ADMIN — METHYLPREDNISOLONE SODIUM SUCCINATE 40 MG: 40 INJECTION INTRAMUSCULAR; INTRAVENOUS at 09:18

## 2023-12-13 RX ADMIN — GUAIFENESIN SYRUP AND DEXTROMETHORPHAN 10 ML: 100; 10 SYRUP ORAL at 09:18

## 2023-12-13 RX ADMIN — ENOXAPARIN SODIUM 40 MG: 100 INJECTION SUBCUTANEOUS at 09:18

## 2023-12-13 RX ADMIN — TRAZODONE HYDROCHLORIDE 50 MG: 50 TABLET ORAL at 20:58

## 2023-12-13 RX ADMIN — SODIUM CHLORIDE, PRESERVATIVE FREE 10 ML: 5 INJECTION INTRAVENOUS at 09:25

## 2023-12-13 NOTE — PLAN OF CARE
Problem: Discharge Planning  Goal: Discharge to home or other facility with appropriate resources  12/13/2023 0040 by Peña Solomon RN  Outcome: Progressing  12/12/2023 1241 by Yodit Adame RN  Outcome: Progressing     Problem: Pain  Goal: Verbalizes/displays adequate comfort level or baseline comfort level  12/13/2023 0040 by Peña Solomon RN  Outcome: Progressing  12/12/2023 1241 by Yodit Adame RN  Outcome: Progressing     Problem: Safety - Adult  Goal: Free from fall injury  12/13/2023 0040 by Peña Solomon RN  Outcome: Progressing  12/12/2023 1241 by Yodit Adame RN  Outcome: Progressing     Problem: Respiratory - Adult  Goal: Achieves optimal ventilation and oxygenation  12/13/2023 0040 by Peña Solomon RN  Outcome: Progressing  12/12/2023 1241 by Yodit Adame RN  Outcome: Progressing     Problem: Musculoskeletal - Adult  Goal: Return mobility to safest level of function  12/13/2023 0040 by Peña Solomon RN  Outcome: Progressing  12/12/2023 1241 by Yodit Adame RN  Outcome: Progressing     Problem: Infection - Adult  Goal: Absence of infection at discharge  12/13/2023 0040 by Peña Solomon RN  Outcome: Progressing  12/12/2023 1241 by Yodit Adame RN  Outcome: Progressing     Problem: Chronic Conditions and Co-morbidities  Goal: Patient's chronic conditions and co-morbidity symptoms are monitored and maintained or improved  12/13/2023 0040 by Peña Solomon RN  Outcome: Progressing  12/12/2023 1241 by Yodit Adame RN  Outcome: Progressing     Problem: Confusion  Goal: Confusion, delirium, dementia, or psychosis is improved or at baseline  Description: INTERVENTIONS:  1. Assess for possible contributors to thought disturbance, including medications, impaired vision or hearing, underlying metabolic abnormalities, dehydration, psychiatric diagnoses, and notify attending LIP  2. [place] high risk fall precautions, as indicated  3.  Provide frequent short contacts to provide reality reorientation, refocusing and direction  4. Decrease environmental stimuli, including noise as appropriate  5. Monitor and intervene to maintain adequate nutrition, hydration, elimination, sleep and activity  6. If unable to ensure safety without constant attention obtain sitter and review sitter guidelines with assigned personnel  7. Initiate Psychosocial CNS and Spiritual Care consult, as indicated  12/13/2023 0040 by Shima James RN  Outcome: Progressing  12/12/2023 1241 by Gretchen Purvis RN  Outcome: Progressing   Alert and oriented Resp. Easy and even Sats. WNL on BIPAP/O2 at 5L per n/c Lungs diminished Cough and deep breathing exercises encouraged No c/o pain Cont.  Per urinal Frequently turns and repositions self Free from fall/injury

## 2023-12-13 NOTE — PROGRESS NOTES
V2.0    [facility] Progress Note      Name:  Rosaura Lawrence /Age/Sex: 1970  (47 y.o. male)   MRN & CSN:  [id number] & [id number] Encounter Date/Time: 2023 12:07 PM EST   Location:  [id number] PCP: KATY Vera CNP     Attending:Quintin Worley MD       Hospital Day: 5    Assessment and Recommendations   Rosaura Lawrence is a 47 y.o. male with pmh of  who presents with Acute respiratory failure ([address])      Acute respiratory failure with hypoxia  H/o polysubstance abuse   Bipolar DO  PTSD    Plan    Telemetry monitering  Pulmonary consult : respiratory status stable  Empiric IV antibiotics : levaquin completed  Changed to PO prednisone   Follow up Blood culture x 2 : NGTD  Oxygen  Duonebs      Psychiatry consulted  , cont 1 : 1 sitter , need psychiatry meds reconciliation    Invega 3 mg/daily, Seroquel  mg/nightly, Buspar 5 mg TID. Increase doses as patient's respiratory issues resolve in the coming days. Monitor for sedation. Diet ADULT DIET; Regular; Safety Tray; Safety Tray (Disposables)   DVT Prophylaxis Invega 3 mg/daily, Seroquel  mg/nightly, Buspar 5 mg TID. Increase doses as patient's respiratory issues resolve in the coming days. Monitor for sedation. [] Lovenox, []  Heparin, [] SCDs, [] Ambulation,  [] Eliquis, [] Xarelto  [] Coumadin   Code Status Full Code   Disposition From:   Expected Disposition:   Estimated Date of Discharge:   Patient requires continued admission due to    Surrogate Decision Maker/ POA       Personally reviewed Lab Studies and Imaging           Subjective:     Chief Complaint:     Rosaura Lawrence is a 47 y.o. male who presents with     CPAP DC today am  On 2-3 L oxygen   He is sitting in bed  Sitter in room +  No chest pain     Review of Systems:      Pertinent positives and negatives discussed in HPI    Objective:      Intake/Output Summary (Last 24 hours) at 2023 1224  Last data filed at 2023 0900  Gross per 24 hour MD on 12/13/2023 at 12:24 PM

## 2023-12-13 NOTE — PLAN OF CARE
Problem: Discharge Planning  Goal: Discharge to home or other facility with appropriate resources  12/13/2023 1158 by Carlo Rodriguez RN  Outcome: Progressing  12/13/2023 0040 by Peña Solomon RN  Outcome: Progressing     Problem: Pain  Goal: Verbalizes/displays adequate comfort level or baseline comfort level  12/13/2023 1158 by Carlo Rodriguez RN  Outcome: Progressing  12/13/2023 0040 by Peña Solomon RN  Outcome: Progressing     Problem: Safety - Adult  Goal: Free from fall injury  12/13/2023 1158 by Carlo Rodriguez RN  Outcome: Progressing  12/13/2023 0040 by Peña Solomon RN  Outcome: Progressing     Problem: Respiratory - Adult  Goal: Achieves optimal ventilation and oxygenation  12/13/2023 1158 by Carlo Rodriguez RN  Outcome: Progressing  Flowsheets (Taken 12/13/2023 0936)  Achieves optimal ventilation and oxygenation: Assess for changes in respiratory status  12/13/2023 0040 by Peña Solomon RN  Outcome: Progressing     Problem: Musculoskeletal - Adult  Goal: Return mobility to safest level of function  12/13/2023 1158 by Carlo Rodriguez RN  Outcome: Progressing  Flowsheets (Taken 12/13/2023 0936)  Return Mobility to Safest Level of Function: Assess patient stability and activity tolerance for standing, transferring and ambulating with or without assistive devices  12/13/2023 0040 by Peña Solomon RN  Outcome: Progressing     Problem: Infection - Adult  Goal: Absence of infection at discharge  12/13/2023 1158 by Carlo Rodriguez RN  Outcome: Progressing  12/13/2023 0040 by Peña Solomon RN  Outcome: Progressing     Problem: Chronic Conditions and Co-morbidities  Goal: Patient's chronic conditions and co-morbidity symptoms are monitored and maintained or improved  12/13/2023 1158 by Carlo Rodriguez RN  Outcome: Progressing  12/13/2023 0040 by Peña Solomon RN  Outcome: Progressing     Problem: Confusion  Goal: Confusion, delirium, dementia, or psychosis is improved or at

## 2023-12-13 NOTE — PROGRESS NOTES
Physician Progress Note      Abraham Figueroa  [facility] #:                  [id number]  :                       1970  ADMIT DATE:       2023 3:42 AM  DISCH DATE:  RESPONDING  PROVIDER #:        Roxane Montiero MD          QUERY TEXT:    Patient admitted with acute respiratory failure and intentional drug overdose. COPD exacerbation documented per ED provider. COPD listed in [facility]. Per    pulmonology consult note \"No known history of asthma/reactive airways   disease/obstructive lung disease. \"  Being treated with [employer],   and DuoNeb. Requiring bipap intermittently. If possible, please document in   progress notes and discharge summary if you are evaluating and /or treating   any of the following: The medical record reflects the following:  Risk Factors: COPD per PMH in Epic  Clinical Indicators: tachypneic, acute respiratory failure, wheezing noted in   ED; CXR shows \"Perihilar prominence could be mild congestion or central   bronchitis\"  Treatment: SoluMedrol,  Levaquin, and DuoNeb, pulmonology consult  Options provided:  -- COPD with exacerbation  -- COPD without exacerbation  -- Other - I will add my own diagnosis  -- Disagree - Not applicable / Not valid  -- Disagree - Clinically unable to determine / Unknown  -- Refer to Clinical Documentation Reviewer    PROVIDER RESPONSE TEXT:    This patient has COPD without exacerbation.     Query created by: Mike Winslow on 2023 7:18 AM      Electronically signed by:  Roxane Monteiro MD 2023 5:48 PM

## 2023-12-13 NOTE — CARE COORDINATION
SW completed chart review, nursing rounds completed. Pt is currently off the Bipap, continues to have a sitter, and psych is seeing, and recommending inpatient psych when stable. LAURITA following for needs.      Sharmaine Aquino LMSW, [address] [place] [address] Social Work Case Management   Phone: [phone]  Fax: [phone]

## 2023-12-13 NOTE — PROGRESS NOTES
Spoke with Dr Elie Allen Related to pts Bipap use. Last pulmonary note said no need for Bipap. Respiratory therapy notified for removal of Bipap and pt educated. Pt placed on 02 at 2L. 02 sat 96% on 2L. No signs of respiratory distress. Pt resting on left side.  Electronically signed by Alexis Murillo RN on 12/13/2023 at 10:54 AM

## 2023-12-14 PROCEDURE — 6370000000 HC RX 637 (ALT 250 FOR IP): Performed by: REGISTERED NURSE

## 2023-12-14 PROCEDURE — 94761 N-INVAS EAR/PLS OXIMETRY MLT: CPT

## 2023-12-14 PROCEDURE — 94640 AIRWAY INHALATION TREATMENT: CPT

## 2023-12-14 PROCEDURE — 6370000000 HC RX 637 (ALT 250 FOR IP): Performed by: HOSPITALIST

## 2023-12-14 PROCEDURE — 2700000000 HC OXYGEN THERAPY PER DAY

## 2023-12-14 PROCEDURE — 6360000002 HC RX W HCPCS: Performed by: HOSPITALIST

## 2023-12-14 PROCEDURE — 2580000003 HC RX 258: Performed by: HOSPITALIST

## 2023-12-14 PROCEDURE — 2060000000 HC ICU INTERMEDIATE R&B

## 2023-12-14 PROCEDURE — 99233 SBSQ HOSP IP/OBS HIGH 50: CPT | Performed by: REGISTERED NURSE

## 2023-12-14 PROCEDURE — 6370000000 HC RX 637 (ALT 250 FOR IP): Performed by: NURSE PRACTITIONER

## 2023-12-14 PROCEDURE — 6370000000 HC RX 637 (ALT 250 FOR IP): Performed by: INTERNAL MEDICINE

## 2023-12-14 RX ORDER — LORAZEPAM 2 MG/ML
1 INJECTION INTRAMUSCULAR EVERY 8 HOURS PRN
Status: DISCONTINUED | OUTPATIENT
Start: 2023-12-14 | End: 2023-12-15 | Stop reason: HOSPADM

## 2023-12-14 RX ORDER — QUETIAPINE 150 MG/1
300 TABLET, FILM COATED, EXTENDED RELEASE ORAL NIGHTLY
Status: DISCONTINUED | OUTPATIENT
Start: 2023-12-14 | End: 2023-12-15 | Stop reason: HOSPADM

## 2023-12-14 RX ADMIN — SODIUM CHLORIDE, PRESERVATIVE FREE 10 ML: 5 INJECTION INTRAVENOUS at 22:15

## 2023-12-14 RX ADMIN — QUETIAPINE FUMARATE 300 MG: 150 TABLET, EXTENDED RELEASE ORAL at 22:21

## 2023-12-14 RX ADMIN — METOPROLOL TARTRATE 25 MG: 25 TABLET, FILM COATED ORAL at 13:28

## 2023-12-14 RX ADMIN — GUAIFENESIN SYRUP AND DEXTROMETHORPHAN 10 ML: 100; 10 SYRUP ORAL at 22:15

## 2023-12-14 RX ADMIN — IPRATROPIUM BROMIDE AND ALBUTEROL SULFATE 1 DOSE: 2.5; .5 SOLUTION RESPIRATORY (INHALATION) at 07:45

## 2023-12-14 RX ADMIN — BUSPIRONE HYDROCHLORIDE 10 MG: 10 TABLET ORAL at 22:15

## 2023-12-14 RX ADMIN — Medication 1 MG: at 06:14

## 2023-12-14 RX ADMIN — PALIPERIDONE 3 MG: 3 TABLET, EXTENDED RELEASE ORAL at 10:36

## 2023-12-14 RX ADMIN — BUSPIRONE HYDROCHLORIDE 10 MG: 10 TABLET ORAL at 13:27

## 2023-12-14 RX ADMIN — FLUOXETINE 20 MG: 20 CAPSULE ORAL at 09:18

## 2023-12-14 RX ADMIN — PREDNISONE 40 MG: 20 TABLET ORAL at 09:19

## 2023-12-14 RX ADMIN — GUAIFENESIN SYRUP AND DEXTROMETHORPHAN 10 ML: 100; 10 SYRUP ORAL at 17:39

## 2023-12-14 RX ADMIN — METOPROLOL TARTRATE 25 MG: 25 TABLET, FILM COATED ORAL at 22:15

## 2023-12-14 RX ADMIN — BENZONATATE 100 MG: 100 CAPSULE ORAL at 13:29

## 2023-12-14 RX ADMIN — GUAIFENESIN SYRUP AND DEXTROMETHORPHAN 10 ML: 100; 10 SYRUP ORAL at 09:19

## 2023-12-14 RX ADMIN — ENOXAPARIN SODIUM 40 MG: 100 INJECTION SUBCUTANEOUS at 09:20

## 2023-12-14 RX ADMIN — SODIUM CHLORIDE, PRESERVATIVE FREE 10 ML: 5 INJECTION INTRAVENOUS at 09:20

## 2023-12-14 RX ADMIN — BENZONATATE 100 MG: 100 CAPSULE ORAL at 06:11

## 2023-12-14 RX ADMIN — BUSPIRONE HYDROCHLORIDE 10 MG: 10 TABLET ORAL at 09:18

## 2023-12-14 RX ADMIN — IPRATROPIUM BROMIDE AND ALBUTEROL SULFATE 1 DOSE: 2.5; .5 SOLUTION RESPIRATORY (INHALATION) at 19:54

## 2023-12-14 RX ADMIN — IPRATROPIUM BROMIDE AND ALBUTEROL SULFATE 1 DOSE: 2.5; .5 SOLUTION RESPIRATORY (INHALATION) at 16:13

## 2023-12-14 RX ADMIN — IPRATROPIUM BROMIDE AND ALBUTEROL SULFATE 1 DOSE: 2.5; .5 SOLUTION RESPIRATORY (INHALATION) at 11:38

## 2023-12-14 ASSESSMENT — PAIN SCALES - GENERAL
PAINLEVEL_OUTOF10: 0

## 2023-12-14 NOTE — PROGRESS NOTES
TECHNOLOGIST PROVIDED HISTORY: Reason for exam:->respiratory distress Reason for Exam: respiratory distress FINDINGS: Borderline size of the cardiac silhouette. Mild prominence of the central vasculature and perihilar markings. Small hiatal hernia is also suspected. No sign of significant pleural effusion and no pneumothorax. No acute fractures are identified. No endotracheal tube or nasogastric tube. Perihilar prominence could be mild congestion or central bronchitis. .       CBC:   Recent Labs     12/12/23 0449   WBC 9.8   HGB 10.8*          BMP:    Recent Labs     12/12/23 0449      K 3.8      CO2 30   BUN 11   CREATININE 0.8*   GLUCOSE 148*       Hepatic:   No results for input(s): \"AST\", \"ALT\", \"ALB\", \"BILITOT\", \"ALKPHOS\" in the last 72 hours. Lipids: No results found for: \"CHOL\", \"HDL\", \"TRIG\"  Hemoglobin A1C: No results found for: \"LABA1C\"  TSH: No results found for: \"TSH\"  Troponin: No results found for: \"TROPONINT\"  Lactic Acid: No results for input(s): \"LACTA\" in the last 72 hours. BNP:   No results for input(s): \"PROBNP\" in the last 72 hours. UA:  Lab Results   Component Value Date/Time    NITRU Negative 12/09/2023 01:42 PM    COLORU Yellow 12/09/2023 01:42 PM    PHUR 6.5 12/09/2023 01:42 PM    PHUR 6.5 12/09/2023 01:42 PM    CLARITYU Clear 12/09/2023 01:42 PM    SPECGRAV 1.011 12/09/2023 01:42 PM    LEUKOCYTESUR Negative 12/09/2023 01:42 PM    UROBILINOGEN 0.2 12/09/2023 01:42 PM    BILIRUBINUR Negative 12/09/2023 01:42 PM    BLOODU Negative 12/09/2023 01:42 PM    GLUCOSEU Negative 12/09/2023 01:42 PM    KETUA Negative 12/09/2023 01:42 PM     Urine Cultures: No results found for: \"LABURIN\"  Blood Cultures:   Lab Results   Component Value Date/Time    BC No Growth after 4 days of incubation. 12/09/2023 04:41 AM     Lab Results   Component Value Date/Time    BLOODCULT2 No Growth after 4 days of incubation.  12/09/2023 04:45 AM     Organism: No results found for: \"ORG\"      Electronically signed by Deedee White MD on 12/14/2023 at 11:33 AM

## 2023-12-14 NOTE — PLAN OF CARE
Problem: Discharge Planning  Goal: Discharge to home or other facility with appropriate resources  Outcome: Progressing  Flowsheets (Taken 12/13/2023 2045)  Discharge to home or other facility with appropriate resources:   Identify barriers to discharge with patient and caregiver   Identify discharge learning needs (meds, wound care, etc)     Problem: Pain  Goal: Verbalizes/displays adequate comfort level or baseline comfort level  Outcome: Progressing  Flowsheets (Taken 12/14/2023 0440)  Verbalizes/displays adequate comfort level or baseline comfort level:   Encourage patient to monitor pain and request assistance   Assess pain using appropriate pain scale   Administer analgesics based on type and severity of pain and evaluate response     Problem: Safety - Adult  Goal: Free from fall injury  Outcome: Progressing  Flowsheets (Taken 12/14/2023 0440)  Free From Fall Injury: Instruct family/caregiver on patient safety     Problem: Respiratory - Adult  Goal: Achieves optimal ventilation and oxygenation  Outcome: Progressing  Flowsheets (Taken 12/13/2023 2045)  Achieves optimal ventilation and oxygenation:   Assess for changes in respiratory status   Assess for changes in mentation and behavior   Position to facilitate oxygenation and minimize respiratory effort   Oxygen supplementation based on oxygen saturation or arterial blood gases     Problem: Musculoskeletal - Adult  Goal: Return mobility to safest level of function  Outcome: Progressing  Flowsheets (Taken 12/13/2023 2045)  Return Mobility to Safest Level of Function:   Assess patient stability and activity tolerance for standing, transferring and ambulating with or without assistive devices   Assist with transfers and ambulation using safe patient handling equipment as needed     Problem: Infection - Adult  Goal: Absence of infection at discharge  Outcome: Progressing  Flowsheets (Taken 12/13/2023 2045)  Absence of infection at discharge:   Assess and monitor for signs and symptoms of infection   Monitor lab/diagnostic results   Monitor all insertion sites i.e., indwelling lines, tubes and drains     Problem: Confusion  Goal: Confusion, delirium, dementia, or psychosis is improved or at baseline  Description: INTERVENTIONS:  1. Assess for possible contributors to thought disturbance, including medications, impaired vision or hearing, underlying metabolic abnormalities, dehydration, psychiatric diagnoses, and notify attending LIP  2. [place] high risk fall precautions, as indicated  3. Provide frequent short contacts to provide reality reorientation, refocusing and direction  4. Decrease environmental stimuli, including noise as appropriate  5. Monitor and intervene to maintain adequate nutrition, hydration, elimination, sleep and activity  6. If unable to ensure safety without constant attention obtain sitter and review sitter guidelines with assigned personnel  7.  Initiate Psychosocial CNS and Spiritual Care consult, as indicated  Outcome: Progressing

## 2023-12-14 NOTE — PROGRESS NOTES
Pt 84% on RA. Pt with complaints of shortness of breath-\"can't catch my breath-need my tube\". Pt placed on 1 L and 02 sat increased to 89 %. Pt increased to 2L and 02 sat 90%.  Electronically signed by Galina Day RN on 12/14/2023 at 11:02 AM

## 2023-12-14 NOTE — PROGRESS NOTES
PSYCHIATRY CONSULT PROGRESS NOTE    12/14/2023  Arian Henson  [id number]  Referring Provider:  Magdaleno Worley MD    CC/Reason for Consult: Bipolar disorder, schizophrenia     Diagnosis/ Impression     Methamphetamine use disorder  Depression, unspecified   Anxiety, unspecified  PTSD     Recommendations:     Psychiatric     1. Patient does NOT require inpatient psych admission. He denies suicidal or homicidal ideation. He denies having AVH. He is not psychotic.     2. Continue Buspar 10 mg TID, Prozac 20 mg/daily, Invega 3 mg/daily. Increase Seroquel  mg nightly > 300 mg/ nightly ( home dose 400 mg/nightly). D/c Trazodone 50 mg/ nightly. .  Changed Ativan 1 mg four times daily to TID PRN. Please, attempt to wean off Ativan before discharge. Monitor QT/QTc prolongation with antipsychotics use. Monitor for mood instability and sleep irregularity with steroids use    3. Continue Mental health services in a community at [facility]. Discussed the importance of sobriety from substance abuse.     4. D/c 1:1 bed side suicide precautions.     5. Discussed POC with RN caring for this patient today.     Dispo: Does not require inpatient psych admission.     Safety: RF: Chronic medical issues, previous IP psych admissions, depression, anxiety, substance use disorder, lives alone, limited social support, and hx legal issues ncluding sex offender list.  Imminent risk is low to moderate to self/others as he is future oriented, goal directed, compliant with medications, and has established out patient MH services providers and  in a community. He has no plan or intent to harm himself or others at this time. At this time, the risks factors for psychiatric hospitalization such as- loss of freedom, demoralization from institutionalization, removal from community supports, potential of regression in inpatient hospital setting, and impact on financial stability) have been carefully considered, weighed, and decision  Location: scattered;     Mental Status Exam:   Appearance    alert, cooperative, appears stated age. Speech    spontaneous  Mood/Affect     normal affect  Thought Process    linear, goal directed, and coherent  Thought Content    intact , no suicidal ideation  Associations    logical connections  Attention/Concentration    intact  Orientation    oriented to person, place, time, and general circumstances  Memory    recent and remote memory intact  Insight/Judgement    Fair / Intact  Labs  No results found for: \"BWRQLAPB47\"  No results found for: \"TSH\", \"P6KBSSR\", \"O0IJJYM\", \"THYROIDAB\"  No results found for: \"FOLATE\"    UDS:   Lab Results   Component Value Date    LABAMPH POSITIVE (A) 12/09/2023    LABBENZ Neg 12/09/2023    CANSU Neg 12/09/2023    COCAIMETSCRU Neg 12/09/2023    OPIATESCREENURINE Neg 12/09/2023    PHENCYCLIDINESCREENURINE Neg 12/09/2023    LABMETH Neg 12/09/2023    OXYCODONEUR Neg 12/09/2023    FENTSCRUR Neg 12/09/2023    PHUR 6.5 12/09/2023    PHUR 6.5 12/09/2023    DSCOMMENT see below 12/09/2023     Imaging:  CT Head:  No results found for this or any previous visit. MRI Brain: No results found for this or any previous visit. EKG:   Vitals:  HR 81 PVC 0 SpO? 95 Pulse ? SpO?? 80  Perf 2.0 RR 15  12/14/2023 03:40:08 Saved strip re?labeled to Sinus Rhythm ? ML?  AK 0.16 QRS 0.09 QT 0.38    Katie Pickard DNP, PMHNP-BC, CNP  [facility]    Thank you for this consult, please call the psychiatry consult line for further questions

## 2023-12-15 VITALS
HEIGHT: 68 IN | HEART RATE: 82 BPM | DIASTOLIC BLOOD PRESSURE: 79 MMHG | TEMPERATURE: 97.9 F | OXYGEN SATURATION: 99 % | SYSTOLIC BLOOD PRESSURE: 146 MMHG | BODY MASS INDEX: 31.54 KG/M2 | RESPIRATION RATE: 18 BRPM | WEIGHT: 208.11 LBS

## 2023-12-15 PROCEDURE — 6370000000 HC RX 637 (ALT 250 FOR IP): Performed by: NURSE PRACTITIONER

## 2023-12-15 PROCEDURE — 6370000000 HC RX 637 (ALT 250 FOR IP): Performed by: INTERNAL MEDICINE

## 2023-12-15 PROCEDURE — 6370000000 HC RX 637 (ALT 250 FOR IP): Performed by: HOSPITALIST

## 2023-12-15 PROCEDURE — 2580000003 HC RX 258: Performed by: HOSPITALIST

## 2023-12-15 PROCEDURE — 94760 N-INVAS EAR/PLS OXIMETRY 1: CPT

## 2023-12-15 PROCEDURE — 6360000002 HC RX W HCPCS: Performed by: HOSPITALIST

## 2023-12-15 PROCEDURE — 6370000000 HC RX 637 (ALT 250 FOR IP): Performed by: REGISTERED NURSE

## 2023-12-15 PROCEDURE — 94640 AIRWAY INHALATION TREATMENT: CPT

## 2023-12-15 PROCEDURE — 2700000000 HC OXYGEN THERAPY PER DAY

## 2023-12-15 RX ORDER — PALIPERIDONE 3 MG/1
3 TABLET, EXTENDED RELEASE ORAL DAILY
Qty: 30 TABLET | Refills: 3 | Status: SHIPPED | OUTPATIENT
Start: 2023-12-16

## 2023-12-15 RX ORDER — GUAIFENESIN/DEXTROMETHORPHAN 100-10MG/5
10 SYRUP ORAL EVERY 4 HOURS PRN
Qty: 120 ML | Refills: 1 | Status: SHIPPED | OUTPATIENT
Start: 2023-12-15 | End: 2023-12-25

## 2023-12-15 RX ORDER — BENZONATATE 100 MG/1
100 CAPSULE ORAL 3 TIMES DAILY PRN
Qty: 15 CAPSULE | Refills: 1 | Status: SHIPPED | OUTPATIENT
Start: 2023-12-15 | End: 2023-12-22

## 2023-12-15 RX ORDER — BUSPIRONE HYDROCHLORIDE 10 MG/1
10 TABLET ORAL 3 TIMES DAILY
Qty: 90 TABLET | Refills: 3 | Status: SHIPPED | OUTPATIENT
Start: 2023-12-15 | End: 2024-01-14

## 2023-12-15 RX ORDER — QUETIAPINE 300 MG/1
300 TABLET, FILM COATED, EXTENDED RELEASE ORAL NIGHTLY
Qty: 30 TABLET | Refills: 3 | Status: SHIPPED | OUTPATIENT
Start: 2023-12-15

## 2023-12-15 RX ADMIN — ACETAMINOPHEN 650 MG: 325 TABLET ORAL at 03:46

## 2023-12-15 RX ADMIN — PALIPERIDONE 3 MG: 3 TABLET, EXTENDED RELEASE ORAL at 08:58

## 2023-12-15 RX ADMIN — BUSPIRONE HYDROCHLORIDE 10 MG: 10 TABLET ORAL at 08:58

## 2023-12-15 RX ADMIN — IPRATROPIUM BROMIDE AND ALBUTEROL SULFATE 1 DOSE: 2.5; .5 SOLUTION RESPIRATORY (INHALATION) at 10:04

## 2023-12-15 RX ADMIN — ENOXAPARIN SODIUM 40 MG: 100 INJECTION SUBCUTANEOUS at 08:58

## 2023-12-15 RX ADMIN — GUAIFENESIN SYRUP AND DEXTROMETHORPHAN 10 ML: 100; 10 SYRUP ORAL at 03:42

## 2023-12-15 RX ADMIN — FLUOXETINE 20 MG: 20 CAPSULE ORAL at 08:58

## 2023-12-15 RX ADMIN — METOPROLOL TARTRATE 25 MG: 25 TABLET, FILM COATED ORAL at 10:09

## 2023-12-15 RX ADMIN — IPRATROPIUM BROMIDE AND ALBUTEROL SULFATE 1 DOSE: 2.5; .5 SOLUTION RESPIRATORY (INHALATION) at 12:13

## 2023-12-15 RX ADMIN — SODIUM CHLORIDE, PRESERVATIVE FREE 10 ML: 5 INJECTION INTRAVENOUS at 08:59

## 2023-12-15 RX ADMIN — BENZONATATE 100 MG: 100 CAPSULE ORAL at 10:09

## 2023-12-15 RX ADMIN — PREDNISONE 40 MG: 20 TABLET ORAL at 08:58

## 2023-12-15 RX ADMIN — GUAIFENESIN SYRUP AND DEXTROMETHORPHAN 10 ML: 100; 10 SYRUP ORAL at 09:01

## 2023-12-15 ASSESSMENT — PAIN SCALES - GENERAL
PAINLEVEL_OUTOF10: 3
PAINLEVEL_OUTOF10: 0

## 2023-12-15 ASSESSMENT — PAIN DESCRIPTION - LOCATION: LOCATION: HEAD

## 2023-12-15 NOTE — PLAN OF CARE
Problem: Discharge Planning  Goal: Discharge to home or other facility with appropriate resources  Outcome: Progressing  Flowsheets (Taken 12/14/2023 2225)  Discharge to home or other facility with appropriate resources: Identify barriers to discharge with patient and caregiver     Problem: Pain  Goal: Verbalizes/displays adequate comfort level or baseline comfort level  Outcome: Progressing     Problem: Safety - Adult  Goal: Free from fall injury  Outcome: Progressing     Problem: Respiratory - Adult  Goal: Achieves optimal ventilation and oxygenation  Outcome: Progressing     Problem: Musculoskeletal - Adult  Goal: Return mobility to safest level of function  Outcome: Progressing  Flowsheets (Taken 12/14/2023 2225)  Return Mobility to Safest Level of Function: Assess patient stability and activity tolerance for standing, transferring and ambulating with or without assistive devices     Problem: Infection - Adult  Goal: Absence of infection at discharge  Outcome: Progressing  Flowsheets (Taken 12/14/2023 2225)  Absence of infection at discharge: Assess and monitor for signs and symptoms of infection     Problem: Chronic Conditions and Co-morbidities  Goal: Patient's chronic conditions and co-morbidity symptoms are monitored and maintained or improved  Outcome: Progressing  Flowsheets (Taken 12/14/2023 2225)  Care Plan - Patient's Chronic Conditions and Co-Morbidity Symptoms are Monitored and Maintained or Improved: Monitor and assess patient's chronic conditions and comorbid symptoms for stability, deterioration, or improvement     Problem: Confusion  Goal: Confusion, delirium, dementia, or psychosis is improved or at baseline  Description: INTERVENTIONS:  1. Assess for possible contributors to thought disturbance, including medications, impaired vision or hearing, underlying metabolic abnormalities, dehydration, psychiatric diagnoses, and notify attending LIP  2.  [place] high risk fall precautions, as indicated  3. Provide frequent short contacts to provide reality reorientation, refocusing and direction  4. Decrease environmental stimuli, including noise as appropriate  5. Monitor and intervene to maintain adequate nutrition, hydration, elimination, sleep and activity  6. If unable to ensure safety without constant attention obtain sitter and review sitter guidelines with assigned personnel  7.  Initiate Psychosocial CNS and Spiritual Care consult, as indicated  Outcome: Progressing

## 2023-12-15 NOTE — DISCHARGE SUMMARY
Hospital Medicine Discharge Summary    Patient ID: Homa Anderson      Patient's PCP: Israel Mena, APRN - CNP    Admit Date: 12/9/2023     Discharge Date: 12/15/2023       Admitting Physician: Phong Byrne MD     Discharge Physician: Phong Byrne MD     Discharge Diagnoses: Active Hospital Problems    Diagnosis     Anxiety state [F41.1]     Depression [F32. A]     Anxiety disorder [F41.9]     Methamphetamine use disorder, moderate (HCC) [F15.20]     Suicidal ideation [R45.851]     Shortness of breath [R06.02]     COPD exacerbation ([address]) [J44.1]     Acute respiratory failure ([address]) [J96.00]        The patient was seen and examined on day of discharge and this discharge summary is in conjunction with any daily progress note from day of discharge. Hospital Course:     Homa Anderson is a 47 y.o. male with pmh of  polysubstance use  , h/o alcohol abuse , mood disorder who presents with  c/o SOB x 2-3 days  His pulmonologist increased his prednisone from 10 mg daily to 40 mg daily   He was brought by EMS , he was tripoding on arrival.  Having significant work of breathing and was placed on BIPAP             ED     D dimer neg      Pulmonology recommendation   Shortness of breath  -Does not have acute pulmonary edema or hypercapnia would discontinue BiPAP  -On scheduled DuoNebs and steroids, though he has no known history of reactive airways disease     Acute intoxication  Substance abuse  -Patient admits to taking meth     Mood disorder  -Also has reported bipolar and schizophrenia  -Will need to home med rec  -psych consult           Psychiatry assessment as follows       Methamphetamine use disorder  Depression, unspecified   Anxiety, unspecified  PTSD      Recommendations:      Psychiatric      1. Patient does NOT require inpatient psych admission. He denies suicidal or homicidal ideation. He denies having AVH. He is not psychotic.       2. Continue Buspar 10 mg TID, Prozac 20 mg/daily, MD   12/15/2023      Thank you KATY Owusu CNP for the opportunity to be involved in this patient's care. If you have any questions or concerns please feel free to contact me at [phone].

## 2023-12-15 NOTE — PLAN OF CARE
Problem: Discharge Planning  Goal: Discharge to home or other facility with appropriate resources  12/15/2023 0116 by Wilner Watson RN  Outcome: Progressing  Flowsheets (Taken 12/14/2023 2225)  Discharge to home or other facility with appropriate resources: Identify barriers to discharge with patient and caregiver     Problem: Pain  Goal: Verbalizes/displays adequate comfort level or baseline comfort level  12/15/2023 0116 by Wilner Watson RN  Outcome: Progressing     Problem: Safety - Adult  Goal: Free from fall injury  12/15/2023 0116 by Wilner Watson RN  Outcome: Progressing     Problem: Respiratory - Adult  Goal: Achieves optimal ventilation and oxygenation  12/15/2023 0116 by iWlner Watson RN  Outcome: Progressing     Problem: Musculoskeletal - Adult  Goal: Return mobility to safest level of function  12/15/2023 0116 by Wilner Watson RN  Outcome: Progressing  Flowsheets (Taken 12/14/2023 2225)  Return Mobility to Safest Level of Function: Assess patient stability and activity tolerance for standing, transferring and ambulating with or without assistive devices     Problem: Infection - Adult  Goal: Absence of infection at discharge  12/15/2023 0116 by Wilner Watson RN  Outcome: Progressing  Flowsheets (Taken 12/14/2023 2225)  Absence of infection at discharge: Assess and monitor for signs and symptoms of infection     Problem: Chronic Conditions and Co-morbidities  Goal: Patient's chronic conditions and co-morbidity symptoms are monitored and maintained or improved  12/15/2023 0116 by Wilner Watson RN  Outcome: Progressing  Flowsheets (Taken 12/14/2023 2225)  Care Plan - Patient's Chronic Conditions and Co-Morbidity Symptoms are Monitored and Maintained or Improved: Monitor and assess patient's chronic conditions and comorbid symptoms for stability, deterioration, or improvement     Problem: Confusion  Goal: Confusion, delirium, dementia, or psychosis is improved or at

## 2023-12-15 NOTE — CARE COORDINATION
SW completed chart review, noted dc order. Pt has nebulizer at home, and no concerns for inpt psych, pt to follow up with his community providers at [facility]. Pt has family/friend to pickup, which nurse reports will be later.      Geo Kebede LMSW, [address] [place] [address] Social Work Case Management   Phone: [phone]  Fax: [phone]

## 2023-12-15 NOTE — PROGRESS NOTES
Discharge instructions reviewed with pt. Spoke with pt about medications, follow up appts, use of oxygen and nebulizers. Handout attached for atrial fib. Pt verbalized understanding. Pt left facility via wheelchair to private car.  Electronically signed by Kiana Kat RN on 12/15/2023 at 12:40 PM

## 2024-08-29 ENCOUNTER — HOSPITAL ENCOUNTER (EMERGENCY)
Age: 54
Discharge: LEFT BEFORE DISCHARGE | End: 2024-08-29
Attending: EMERGENCY MEDICINE
Payer: MEDICAID

## 2024-08-29 VITALS
OXYGEN SATURATION: 100 % | RESPIRATION RATE: 26 BRPM | BODY MASS INDEX: 31.64 KG/M2 | HEIGHT: 68 IN | HEART RATE: 118 BPM

## 2024-08-29 DIAGNOSIS — J44.1 COPD EXACERBATION (HCC): Primary | ICD-10-CM

## 2024-08-29 PROCEDURE — 2700000000 HC OXYGEN THERAPY PER DAY

## 2024-08-29 PROCEDURE — 99284 EMERGENCY DEPT VISIT MOD MDM: CPT

## 2024-08-29 PROCEDURE — 94761 N-INVAS EAR/PLS OXIMETRY MLT: CPT

## 2024-08-29 PROCEDURE — 94640 AIRWAY INHALATION TREATMENT: CPT

## 2024-08-29 RX ORDER — PREDNISONE 10 MG/1
60 TABLET ORAL DAILY
Qty: 24 TABLET | Refills: 0 | Status: SHIPPED | OUTPATIENT
Start: 2024-08-29 | End: 2024-09-02

## 2024-08-29 RX ORDER — PREDNISONE 20 MG/1
60 TABLET ORAL ONCE
Status: DISCONTINUED | OUTPATIENT
Start: 2024-08-29 | End: 2024-08-29 | Stop reason: HOSPADM

## 2024-08-29 RX ORDER — AZITHROMYCIN 250 MG/1
TABLET, FILM COATED ORAL
Qty: 1 PACKET | Refills: 0 | Status: SHIPPED | OUTPATIENT
Start: 2024-08-29

## 2024-08-29 NOTE — ED PROVIDER NOTES
EMERGENCY DEPARTMENT ENCOUNTER     [facility] EMERGENCY DEPARTMENT     Pt Name: Arian Henson   MRN: [id number]   Birthdate 1970   Date of evaluation: 8/29/2024   Provider: Wilfred Melara MD   PCP: Michael Horne, APRN - CNP   Note Started: 1:47 PM EDT 8/29/24     CHIEF COMPLAINT     Chief Complaint   Patient presents with    Shortness of Breath     Pt in ED for Shortness of breath x3 days.  Pt is 3L baseline, he was placed on Cpap for being tachypnea. Pt is on 5 L NC at 100% O2 per respiratory, pt is currently receiving duo-neb.  Pt has hx of COPD.         HISTORY OF PRESENT ILLNESS:  History from : Patient and EMS   Limitations to history : None     Arian Henson is a 54 y.o. male who presents for shortness of breath.  EMS reports they were called for shortness of breath.  Patient has COPD and is on 3 L of oxygen by nasal cannula at baseline.  Patient denies chest pain.    Nursing Notes were all reviewed and agreed with or any disagreements were addressed in the HPI.     ROS: Positives and Pertinent negatives as per HPI.    PAST MEDICAL HISTORY     Past medical history:  has a past medical history of Bipolar 1 disorder (HCC), COPD (chronic obstructive pulmonary disease) (HCC), and PTSD (post-traumatic stress disorder).    Past surgical history:  has no past surgical history on file.    Med list:   No current facility-administered medications on file prior to encounter.     Current Outpatient Medications on File Prior to Encounter   Medication Sig Dispense Refill    paliperidone (INVEGA) 3 MG extended release tablet Take 1 tablet by mouth daily 30 tablet 3    QUEtiapine (SEROQUEL XR) 300 MG extended release tablet Take 1 tablet by mouth nightly 30 tablet 3    metoprolol tartrate (LOPRESSOR) 25 MG tablet Take 1 tablet by mouth 2 times daily 60 tablet 3    albuterol sulfate HFA (PROVENTIL;VENTOLIN;PROAIR) 108 (90 Base) MCG/ACT inhaler Inhale 2 puffs into the lungs every 6 hours as  following medications:   Medications   predniSONE (DELTASONE) tablet 60 mg (has no administration in time range)         Reid Coma Scale  Eye Opening: Spontaneous  Best Verbal Response: Oriented  Best Motor Response: Obeys commands  [place] Coma Scale Score: 15        CC/HPI Summary, DDx, ED Course, and Reassessment: I came to the bedside and saw the patient very agitated and breathing heavily.  The respiratory therapist was getting ready to initiate a breathing treatment and I advised that they should go forward with that and that I also think the patient may be having anxiety given that he has normal oxygen saturation on his home 3 L.  He was satting 99 to 100% on 3 L.  When the patient heard this he became oppositional and accused us of saying that there was nothing wrong with him and then asked us to discontinue all of our diagnostic and therapeutic interventions.  He remove the IV from his arm.  I attempted to convince him to stay and he would not be dissuaded from leaving AGAINST MEDICAL ADVICE.  I advised him that if he changes his mind we are here to help him.    I believe patient is of normal decision-making capacity.    I am the primary physician of Record.     FINAL IMPRESSION    1. COPD exacerbation (HCC)         DISPOSITION/PLAN   DISPOSITION Ephraim 08/29/2024 01:48:42 PM  Condition at Disposition: Data Unavailable       PATIENT REFERRED TO:   Michael Horne, APRN - CNP  [address] [id number] [phone]    Schedule an appointment as soon as possible for a visit in 2 days      [facility] Emergency Department  [address] [address]  [phone]  Go to   immediately if symptoms worsen     DISCHARGE MEDICATIONS:   Discharge Medication List as of 8/29/2024  2:36 PM        START taking these medications    Details   predniSONE (DELTASONE) 10 MG tablet Take 6 tablets by mouth daily for 4 days, Disp-24 tablet, R-0Print      azithromycin (ZITHROMAX) 250 MG

## 2024-08-29 NOTE — ED NOTES
This RN called to the squad doors for someone sitting out there.  Patient was sitting on the ground outside the squad doors.  He is refusing to come back inside to be seen.  He was asked to wait by the main doors or in the lobby.  He refused to allow this RN to get a WC, he was yelling at this RN to \"get away\"  Several EMS were outside as well and he refused any assistance, one EMS gave him a bottle of water.  Patient is sitting on a bench waiting for his ride.

## 2024-08-29 NOTE — ED TRIAGE NOTES
Pt in ED for Shortness of breath x3 days. Pt states SOB accompanied by diarrhea, blurred vision, and dizziness. Pt is 3L baseline, he was placed on Cpap for being tachypnea. Pt is on 5 L NC at 100% O2 per respiratory, pt is currently receiving duo-neb.  Pt has hx of COPD. A & O x4.

## 2024-08-29 NOTE — ED NOTES
EDM in room to assess pt. Pt with increased anxiety, becoming verbally agitated with MD and ED staff. Stating that \"I did not even want to come here\" Pt stating to MD that he wishes to \"leave AMA\" AMA signed by pt. Pt removing all leads. Pt made aware that he is having increased work of breathing, and does not have o2 with him for D/C. Pt a/ox4, continues to wish to leave AMA

## 2024-08-29 NOTE — ED NOTES
Patient is still on the bench out front of the ED.  He was given some more water and offered to bring him inside.  He did not want to come  back in the ED to be seen.  He states his ride will be here in less than 15 min.  Will continue to check on the patient

## 2024-08-29 NOTE — ED NOTES
Patients ride went to the wrong hospital.  He is still on the bench outside.  He was given more water and says he is good.

## 2024-10-17 ENCOUNTER — APPOINTMENT (OUTPATIENT)
Dept: GENERAL RADIOLOGY | Age: 54
End: 2024-10-17
Payer: MEDICAID

## 2024-10-17 ENCOUNTER — HOSPITAL ENCOUNTER (EMERGENCY)
Age: 54
Discharge: HOME | End: 2024-10-17
Attending: EMERGENCY MEDICINE
Payer: MEDICAID

## 2024-10-17 VITALS
DIASTOLIC BLOOD PRESSURE: 83 MMHG | HEART RATE: 108 BPM | TEMPERATURE: 97 F | HEIGHT: 68 IN | WEIGHT: 208.11 LBS | OXYGEN SATURATION: 98 % | RESPIRATION RATE: 22 BRPM | SYSTOLIC BLOOD PRESSURE: 134 MMHG | BODY MASS INDEX: 31.54 KG/M2

## 2024-10-17 DIAGNOSIS — J44.1 COPD EXACERBATION (HCC): Primary | ICD-10-CM

## 2024-10-17 LAB
ALBUMIN SERPL-MCNC: 3.5 G/DL (ref 3.4–5)
ALBUMIN/GLOB SERPL: 1.6 {RATIO} (ref 1.1–2.2)
ALP SERPL-CCNC: 73 U/L (ref 40–129)
ALT SERPL-CCNC: 27 U/L (ref 10–40)
AMPHETAMINES UR QL SCN>1000 NG/ML: NORMAL
ANION GAP SERPL CALCULATED.3IONS-SCNC: 11 MMOL/L (ref 3–16)
AST SERPL-CCNC: 18 U/L (ref 15–37)
BARBITURATES UR QL SCN>200 NG/ML: NORMAL
BASE EXCESS BLDV CALC-SCNC: 0.6 MMOL/L
BASOPHILS # BLD: 0 K/UL (ref 0–0.2)
BASOPHILS NFR BLD: 0.2 %
BENZODIAZ UR QL SCN>200 NG/ML: NORMAL
BILIRUB SERPL-MCNC: <0.2 MG/DL (ref 0–1)
BILIRUB UR QL STRIP.AUTO: NEGATIVE
BUN SERPL-MCNC: 12 MG/DL (ref 7–20)
CALCIUM SERPL-MCNC: 9 MG/DL (ref 8.3–10.6)
CANNABINOIDS UR QL SCN>50 NG/ML: NORMAL
CHLORIDE SERPL-SCNC: 104 MMOL/L (ref 99–110)
CLARITY UR: CLEAR
CO2 BLDV-SCNC: 27 MMOL/L
CO2 SERPL-SCNC: 20 MMOL/L (ref 21–32)
COCAINE UR QL SCN: NORMAL
COHGB MFR BLDV: 1.6 %
COLOR UR: YELLOW
CREAT SERPL-MCNC: 0.9 MG/DL (ref 0.9–1.3)
DEPRECATED RDW RBC AUTO: 15.5 % (ref 12.4–15.4)
DRUG SCREEN COMMENT UR-IMP: NORMAL
EOSINOPHIL # BLD: 0 K/UL (ref 0–0.6)
EOSINOPHIL NFR BLD: 0.1 %
FENTANYL SCREEN, URINE: NORMAL
FLUAV RNA UPPER RESP QL NAA+PROBE: NEGATIVE
FLUBV AG NPH QL: NEGATIVE
GFR SERPLBLD CREATININE-BSD FMLA CKD-EPI: >90 ML/MIN/{1.73_M2}
GLUCOSE SERPL-MCNC: 179 MG/DL (ref 70–99)
GLUCOSE UR STRIP.AUTO-MCNC: NEGATIVE MG/DL
HCO3 BLDV-SCNC: 26 MMOL/L (ref 23–29)
HCT VFR BLD AUTO: 35.4 % (ref 40.5–52.5)
HGB BLD-MCNC: 11.8 G/DL (ref 13.5–17.5)
HGB UR QL STRIP.AUTO: NEGATIVE
KETONES UR STRIP.AUTO-MCNC: NEGATIVE MG/DL
LACTATE BLDV-SCNC: 2 MMOL/L (ref 0.4–1.9)
LEUKOCYTE ESTERASE UR QL STRIP.AUTO: NEGATIVE
LIPASE SERPL-CCNC: 33 U/L (ref 13–60)
LYMPHOCYTES # BLD: 0.7 K/UL (ref 1–5.1)
LYMPHOCYTES NFR BLD: 6 %
MCH RBC QN AUTO: 31 PG (ref 26–34)
MCHC RBC AUTO-ENTMCNC: 33.4 G/DL (ref 31–36)
MCV RBC AUTO: 92.8 FL (ref 80–100)
METHADONE UR QL SCN>300 NG/ML: NORMAL
METHGB MFR BLDV: 0.8 %
MONOCYTES # BLD: 0.5 K/UL (ref 0–1.3)
MONOCYTES NFR BLD: 4.5 %
NEUTROPHILS # BLD: 10.1 K/UL (ref 1.7–7.7)
NEUTROPHILS NFR BLD: 89.2 %
NITRITE UR QL STRIP.AUTO: NEGATIVE
NT-PROBNP SERPL-MCNC: 119 PG/ML (ref 0–124)
O2 THERAPY: NORMAL
OPIATES UR QL SCN>300 NG/ML: NORMAL
OXYCODONE UR QL SCN: NORMAL
PCO2 BLDV: 42.7 MMHG (ref 40–50)
PCP UR QL SCN>25 NG/ML: NORMAL
PH BLDV: 7.39 [PH] (ref 7.35–7.45)
PH UR STRIP.AUTO: 5.5 [PH] (ref 5–8)
PH UR STRIP: 5.5 [PH]
PLATELET # BLD AUTO: 334 K/UL (ref 135–450)
PMV BLD AUTO: 6.7 FL (ref 5–10.5)
PO2 BLDV: 85 MMHG
POTASSIUM SERPL-SCNC: 4.1 MMOL/L (ref 3.5–5.1)
PROT SERPL-MCNC: 5.7 G/DL (ref 6.4–8.2)
PROT UR STRIP.AUTO-MCNC: NEGATIVE MG/DL
RBC # BLD AUTO: 3.81 M/UL (ref 4.2–5.9)
SAO2 % BLDV: 95 %
SARS-COV-2 RDRP RESP QL NAA+PROBE: NOT DETECTED
SODIUM SERPL-SCNC: 135 MMOL/L (ref 136–145)
SP GR UR STRIP.AUTO: 1.01 (ref 1–1.03)
TROPONIN, HIGH SENSITIVITY: 8 NG/L (ref 0–22)
TROPONIN, HIGH SENSITIVITY: 9 NG/L (ref 0–22)
UA COMPLETE W REFLEX CULTURE PNL UR: NORMAL
UA DIPSTICK W REFLEX MICRO PNL UR: NORMAL
URN SPEC COLLECT METH UR: NORMAL
UROBILINOGEN UR STRIP-ACNC: 0.2 E.U./DL
WBC # BLD AUTO: 11.4 K/UL (ref 4–11)

## 2024-10-17 PROCEDURE — 6360000002 HC RX W HCPCS: Performed by: EMERGENCY MEDICINE

## 2024-10-17 PROCEDURE — 71045 X-RAY EXAM CHEST 1 VIEW: CPT

## 2024-10-17 PROCEDURE — 84484 ASSAY OF TROPONIN QUANT: CPT

## 2024-10-17 PROCEDURE — 81003 URINALYSIS AUTO W/O SCOPE: CPT

## 2024-10-17 PROCEDURE — 82803 BLOOD GASES ANY COMBINATION: CPT

## 2024-10-17 PROCEDURE — 87040 BLOOD CULTURE FOR BACTERIA: CPT

## 2024-10-17 PROCEDURE — 94640 AIRWAY INHALATION TREATMENT: CPT

## 2024-10-17 PROCEDURE — 93005 ELECTROCARDIOGRAM TRACING: CPT | Performed by: EMERGENCY MEDICINE

## 2024-10-17 PROCEDURE — 96374 THER/PROPH/DIAG INJ IV PUSH: CPT

## 2024-10-17 PROCEDURE — 83880 ASSAY OF NATRIURETIC PEPTIDE: CPT

## 2024-10-17 PROCEDURE — 96375 TX/PRO/DX INJ NEW DRUG ADDON: CPT

## 2024-10-17 PROCEDURE — 6370000000 HC RX 637 (ALT 250 FOR IP): Performed by: EMERGENCY MEDICINE

## 2024-10-17 PROCEDURE — 2700000000 HC OXYGEN THERAPY PER DAY

## 2024-10-17 PROCEDURE — 99285 EMERGENCY DEPT VISIT HI MDM: CPT

## 2024-10-17 PROCEDURE — 94761 N-INVAS EAR/PLS OXIMETRY MLT: CPT

## 2024-10-17 PROCEDURE — 83690 ASSAY OF LIPASE: CPT

## 2024-10-17 PROCEDURE — 85025 COMPLETE CBC W/AUTO DIFF WBC: CPT

## 2024-10-17 PROCEDURE — 87804 INFLUENZA ASSAY W/OPTIC: CPT

## 2024-10-17 PROCEDURE — 36415 COLL VENOUS BLD VENIPUNCTURE: CPT

## 2024-10-17 PROCEDURE — 80307 DRUG TEST PRSMV CHEM ANLYZR: CPT

## 2024-10-17 PROCEDURE — 87635 SARS-COV-2 COVID-19 AMP PRB: CPT

## 2024-10-17 PROCEDURE — 80053 COMPREHEN METABOLIC PANEL: CPT

## 2024-10-17 PROCEDURE — 83605 ASSAY OF LACTIC ACID: CPT

## 2024-10-17 RX ORDER — ONDANSETRON 2 MG/ML
4 INJECTION INTRAMUSCULAR; INTRAVENOUS ONCE
Status: COMPLETED | OUTPATIENT
Start: 2024-10-17 | End: 2024-10-17

## 2024-10-17 RX ORDER — AZITHROMYCIN 250 MG/1
TABLET, FILM COATED ORAL
Qty: 6 TABLET | Refills: 0 | Status: SHIPPED | OUTPATIENT
Start: 2024-10-17 | End: 2024-10-27

## 2024-10-17 RX ORDER — PREDNISONE 20 MG/1
60 TABLET ORAL ONCE
Status: COMPLETED | OUTPATIENT
Start: 2024-10-17 | End: 2024-10-17

## 2024-10-17 RX ORDER — ALBUTEROL SULFATE 90 UG/1
2 INHALANT RESPIRATORY (INHALATION) 4 TIMES DAILY PRN
Qty: 18 G | Refills: 0 | Status: SHIPPED | OUTPATIENT
Start: 2024-10-17

## 2024-10-17 RX ORDER — ASPIRIN 325 MG
325 TABLET ORAL ONCE
Status: COMPLETED | OUTPATIENT
Start: 2024-10-17 | End: 2024-10-17

## 2024-10-17 RX ORDER — IPRATROPIUM BROMIDE AND ALBUTEROL SULFATE 2.5; .5 MG/3ML; MG/3ML
2 SOLUTION RESPIRATORY (INHALATION) ONCE
Status: COMPLETED | OUTPATIENT
Start: 2024-10-17 | End: 2024-10-17

## 2024-10-17 RX ORDER — FENTANYL CITRATE 50 UG/ML
50 INJECTION, SOLUTION INTRAMUSCULAR; INTRAVENOUS ONCE
Status: COMPLETED | OUTPATIENT
Start: 2024-10-17 | End: 2024-10-17

## 2024-10-17 RX ORDER — PREDNISONE 10 MG/1
60 TABLET ORAL DAILY
Qty: 30 TABLET | Refills: 0 | Status: SHIPPED | OUTPATIENT
Start: 2024-10-17 | End: 2024-10-22

## 2024-10-17 RX ADMIN — ASPIRIN 325 MG: 325 TABLET ORAL at 02:28

## 2024-10-17 RX ADMIN — ONDANSETRON 4 MG: 2 INJECTION INTRAMUSCULAR; INTRAVENOUS at 02:01

## 2024-10-17 RX ADMIN — IPRATROPIUM BROMIDE AND ALBUTEROL SULFATE 2 DOSE: 2.5; .5 SOLUTION RESPIRATORY (INHALATION) at 02:54

## 2024-10-17 RX ADMIN — FENTANYL CITRATE 50 MCG: 50 INJECTION INTRAMUSCULAR; INTRAVENOUS at 02:29

## 2024-10-17 RX ADMIN — PREDNISONE 60 MG: 20 TABLET ORAL at 01:47

## 2024-10-17 ASSESSMENT — HEART SCORE: ECG: NORMAL

## 2024-10-17 ASSESSMENT — PAIN - FUNCTIONAL ASSESSMENT
PAIN_FUNCTIONAL_ASSESSMENT: 0-10
PAIN_FUNCTIONAL_ASSESSMENT: ACTIVITIES ARE NOT PREVENTED

## 2024-10-17 ASSESSMENT — PAIN DESCRIPTION - ORIENTATION: ORIENTATION: LEFT;RIGHT

## 2024-10-17 ASSESSMENT — PAIN SCALES - GENERAL
PAINLEVEL_OUTOF10: 7
PAINLEVEL_OUTOF10: 7

## 2024-10-17 ASSESSMENT — LIFESTYLE VARIABLES
HOW OFTEN DO YOU HAVE A DRINK CONTAINING ALCOHOL: NEVER
HOW MANY STANDARD DRINKS CONTAINING ALCOHOL DO YOU HAVE ON A TYPICAL DAY: PATIENT DOES NOT DRINK

## 2024-10-17 ASSESSMENT — PAIN DESCRIPTION - FREQUENCY: FREQUENCY: CONTINUOUS

## 2024-10-17 ASSESSMENT — PAIN DESCRIPTION - LOCATION: LOCATION: CHEST;RIB CAGE

## 2024-10-17 ASSESSMENT — PAIN DESCRIPTION - PAIN TYPE: TYPE: ACUTE PAIN

## 2024-10-17 ASSESSMENT — PAIN DESCRIPTION - DESCRIPTORS: DESCRIPTORS: BURNING;PRESSURE

## 2024-10-17 NOTE — ED TRIAGE NOTES
Pt states that he has been experiencing SOB that worsens with exertion. Pt endorses dizziness, cough, chest pain. Pt has hx of COPD and pulmonary congestion. Pt states that he has recent hx of pneumonia and abx finished on Monday. Pt AAO x 4. VSS.

## 2024-10-17 NOTE — ED NOTES
Pt appeared to be extremely agitated and talking very fast. Pt appeared to be aggressive while getting in and out of wheelchair while getting weight and threw blanket onto bed while being placed in his room.

## 2024-10-17 NOTE — ED PROVIDER NOTES
with outpatient follow-up and was prescribed medications for COPD exacerbation.  Return precautions discussed with patient.  Patient does have a low heart score and have low suspicion for ACS believe the patient is safer discharge home chest pain has resolved.     CONSULTS: (Who and What was discussed)  None          Chronic Conditions:   Past Medical History:   Diagnosis Date    Bipolar 1 disorder (HCC)     COPD (chronic obstructive pulmonary disease) (HCC)     PTSD (post-traumatic stress disorder)          Records Reviewed (External and source): Reviewed patient's most recent admission as well as most recent pulmonology encounters.     Disposition Considerations (include 1 Tests not done, Admit vs D/C, Shared Decision Making, Pt Expectation of Test or Tx.): Consider obtaining CT PE study but patient reports improvement of symptoms after DuoNeb treatments states that symptoms were similar to previous COPD exacerbations.     Admission to the hospital considered but patient's symptoms are improving.  Vital signs and testing performed is reassuring.  Based on this patient is appropriate for outpatient management.  No indication for admission at this time.     Symptomatic treatment with expectant management discussed with the patient and/or family member or surrogates present and they are amenable to treatment plan and outpatient follow-up.  Strict return precautions were discussed with the patient and those present.  All parties involved were informed that condition may persist or worsen in which case they may then require inpatient treatment, currently there is no indication.  They demonstrated understanding of when to return to the emergency department for new or worsening symptoms.      I am the Primary Clinician of Record.    FINAL IMPRESSION      1. COPD exacerbation (HCC)          DISPOSITION/PLAN     DISPOSITION    Condition at Disposition: Data Unavailable      PATIENT REFERRED TO:  No follow-up provider

## 2024-10-18 LAB
BACTERIA BLD CULT: NORMAL
EKG ATRIAL RATE: 97 BPM
EKG DIAGNOSIS: NORMAL
EKG P AXIS: 75 DEGREES
EKG P-R INTERVAL: 130 MS
EKG Q-T INTERVAL: 368 MS
EKG QRS DURATION: 80 MS
EKG QTC CALCULATION (BAZETT): 467 MS
EKG R AXIS: 61 DEGREES
EKG T AXIS: 63 DEGREES
EKG VENTRICULAR RATE: 97 BPM

## 2024-10-18 PROCEDURE — 93010 ELECTROCARDIOGRAM REPORT: CPT | Performed by: INTERNAL MEDICINE

## 2024-10-21 LAB — BACTERIA BLD CULT: NORMAL
